# Patient Record
Sex: MALE | Race: OTHER | HISPANIC OR LATINO | URBAN - METROPOLITAN AREA
[De-identification: names, ages, dates, MRNs, and addresses within clinical notes are randomized per-mention and may not be internally consistent; named-entity substitution may affect disease eponyms.]

---

## 2023-01-15 ENCOUNTER — INPATIENT (INPATIENT)
Facility: HOSPITAL | Age: 73
LOS: 3 days | Discharge: NURSING, REHAB OR LONG-TERM CARE | DRG: 872 | End: 2023-01-19
Attending: INTERNAL MEDICINE | Admitting: STUDENT IN AN ORGANIZED HEALTH CARE EDUCATION/TRAINING PROGRAM
Payer: MEDICARE

## 2023-01-15 VITALS
RESPIRATION RATE: 16 BRPM | OXYGEN SATURATION: 98 % | SYSTOLIC BLOOD PRESSURE: 107 MMHG | HEART RATE: 94 BPM | TEMPERATURE: 98 F | WEIGHT: 184.97 LBS | DIASTOLIC BLOOD PRESSURE: 72 MMHG

## 2023-01-15 DIAGNOSIS — R53.1 WEAKNESS: ICD-10-CM

## 2023-01-15 DIAGNOSIS — A41.9 SEPSIS, UNSPECIFIED ORGANISM: ICD-10-CM

## 2023-01-15 DIAGNOSIS — H40.9 UNSPECIFIED GLAUCOMA: ICD-10-CM

## 2023-01-15 DIAGNOSIS — N40.0 BENIGN PROSTATIC HYPERPLASIA WITHOUT LOWER URINARY TRACT SYMPTOMS: ICD-10-CM

## 2023-01-15 DIAGNOSIS — K59.09 OTHER CONSTIPATION: ICD-10-CM

## 2023-01-15 DIAGNOSIS — R74.01 ELEVATION OF LEVELS OF LIVER TRANSAMINASE LEVELS: ICD-10-CM

## 2023-01-15 DIAGNOSIS — D69.6 THROMBOCYTOPENIA, UNSPECIFIED: ICD-10-CM

## 2023-01-15 DIAGNOSIS — F10.90 ALCOHOL USE, UNSPECIFIED, UNCOMPLICATED: ICD-10-CM

## 2023-01-15 DIAGNOSIS — R05.3 CHRONIC COUGH: ICD-10-CM

## 2023-01-15 DIAGNOSIS — D69.59 OTHER SECONDARY THROMBOCYTOPENIA: ICD-10-CM

## 2023-01-15 DIAGNOSIS — D50.9 IRON DEFICIENCY ANEMIA, UNSPECIFIED: ICD-10-CM

## 2023-01-15 DIAGNOSIS — M24.541 CONTRACTURE, RIGHT HAND: ICD-10-CM

## 2023-01-15 DIAGNOSIS — L03.116 CELLULITIS OF LEFT LOWER LIMB: ICD-10-CM

## 2023-01-15 DIAGNOSIS — Z87.891 PERSONAL HISTORY OF NICOTINE DEPENDENCE: ICD-10-CM

## 2023-01-15 DIAGNOSIS — M46.22 OSTEOMYELITIS OF VERTEBRA, CERVICAL REGION: ICD-10-CM

## 2023-01-15 DIAGNOSIS — M24.542 CONTRACTURE, LEFT HAND: ICD-10-CM

## 2023-01-15 LAB
ALBUMIN SERPL ELPH-MCNC: 3.1 G/DL — LOW (ref 3.4–5)
ALP SERPL-CCNC: 75 U/L — SIGNIFICANT CHANGE UP (ref 40–120)
ALT FLD-CCNC: 40 U/L — SIGNIFICANT CHANGE UP (ref 12–42)
ANION GAP SERPL CALC-SCNC: 6 MMOL/L — LOW (ref 9–16)
APPEARANCE UR: CLEAR — SIGNIFICANT CHANGE UP
AST SERPL-CCNC: 98 U/L — HIGH (ref 15–37)
BASOPHILS # BLD AUTO: 0.04 K/UL — SIGNIFICANT CHANGE UP (ref 0–0.2)
BASOPHILS NFR BLD AUTO: 0.3 % — SIGNIFICANT CHANGE UP (ref 0–2)
BILIRUB SERPL-MCNC: 0.8 MG/DL — SIGNIFICANT CHANGE UP (ref 0.2–1.2)
BILIRUB UR-MCNC: NEGATIVE — SIGNIFICANT CHANGE UP
BUN SERPL-MCNC: 17 MG/DL — SIGNIFICANT CHANGE UP (ref 7–23)
CALCIUM SERPL-MCNC: 8.8 MG/DL — SIGNIFICANT CHANGE UP (ref 8.5–10.5)
CHLORIDE SERPL-SCNC: 103 MMOL/L — SIGNIFICANT CHANGE UP (ref 96–108)
CO2 SERPL-SCNC: 27 MMOL/L — SIGNIFICANT CHANGE UP (ref 22–31)
COLOR SPEC: YELLOW — SIGNIFICANT CHANGE UP
CREAT SERPL-MCNC: 1.16 MG/DL — SIGNIFICANT CHANGE UP (ref 0.5–1.3)
DIFF PNL FLD: ABNORMAL
EGFR: 67 ML/MIN/1.73M2 — SIGNIFICANT CHANGE UP
EOSINOPHIL # BLD AUTO: 0.02 K/UL — SIGNIFICANT CHANGE UP (ref 0–0.5)
EOSINOPHIL NFR BLD AUTO: 0.1 % — SIGNIFICANT CHANGE UP (ref 0–6)
GLUCOSE SERPL-MCNC: 154 MG/DL — HIGH (ref 70–99)
GLUCOSE UR QL: NEGATIVE — SIGNIFICANT CHANGE UP
HCT VFR BLD CALC: 37.1 % — LOW (ref 39–50)
HGB BLD-MCNC: 11.5 G/DL — LOW (ref 13–17)
IMM GRANULOCYTES NFR BLD AUTO: 1 % — HIGH (ref 0–0.9)
KETONES UR-MCNC: NEGATIVE — SIGNIFICANT CHANGE UP
LEUKOCYTE ESTERASE UR-ACNC: NEGATIVE — SIGNIFICANT CHANGE UP
LIDOCAIN IGE QN: 73 U/L — SIGNIFICANT CHANGE UP (ref 73–393)
LYMPHOCYTES # BLD AUTO: 1.8 K/UL — SIGNIFICANT CHANGE UP (ref 1–3.3)
LYMPHOCYTES # BLD AUTO: 12 % — LOW (ref 13–44)
MCHC RBC-ENTMCNC: 27.8 PG — SIGNIFICANT CHANGE UP (ref 27–34)
MCHC RBC-ENTMCNC: 31 GM/DL — LOW (ref 32–36)
MCV RBC AUTO: 89.8 FL — SIGNIFICANT CHANGE UP (ref 80–100)
MONOCYTES # BLD AUTO: 1.32 K/UL — HIGH (ref 0–0.9)
MONOCYTES NFR BLD AUTO: 8.8 % — SIGNIFICANT CHANGE UP (ref 2–14)
NEUTROPHILS # BLD AUTO: 11.71 K/UL — HIGH (ref 1.8–7.4)
NEUTROPHILS NFR BLD AUTO: 77.8 % — HIGH (ref 43–77)
NITRITE UR-MCNC: NEGATIVE — SIGNIFICANT CHANGE UP
NRBC # BLD: 0 /100 WBCS — SIGNIFICANT CHANGE UP (ref 0–0)
PH UR: 6 — SIGNIFICANT CHANGE UP (ref 5–8)
PLATELET # BLD AUTO: 145 K/UL — LOW (ref 150–400)
POTASSIUM SERPL-MCNC: 4.4 MMOL/L — SIGNIFICANT CHANGE UP (ref 3.5–5.3)
POTASSIUM SERPL-SCNC: 4.4 MMOL/L — SIGNIFICANT CHANGE UP (ref 3.5–5.3)
PROT SERPL-MCNC: 7.6 G/DL — SIGNIFICANT CHANGE UP (ref 6.4–8.2)
PROT UR-MCNC: NEGATIVE MG/DL — SIGNIFICANT CHANGE UP
RBC # BLD: 4.13 M/UL — LOW (ref 4.2–5.8)
RBC # FLD: 14.5 % — SIGNIFICANT CHANGE UP (ref 10.3–14.5)
RBC CASTS # UR COMP ASSIST: < 5 /HPF — SIGNIFICANT CHANGE UP
SARS-COV-2 RNA SPEC QL NAA+PROBE: SIGNIFICANT CHANGE UP
SODIUM SERPL-SCNC: 136 MMOL/L — SIGNIFICANT CHANGE UP (ref 132–145)
SP GR SPEC: 1.02 — SIGNIFICANT CHANGE UP (ref 1–1.03)
TROPONIN I, HIGH SENSITIVITY RESULT: 55.7 NG/L — SIGNIFICANT CHANGE UP
UROBILINOGEN FLD QL: 0.2 E.U./DL — SIGNIFICANT CHANGE UP
WBC # BLD: 15.04 K/UL — HIGH (ref 3.8–10.5)
WBC # FLD AUTO: 15.04 K/UL — HIGH (ref 3.8–10.5)
WBC UR QL: < 5 /HPF — SIGNIFICANT CHANGE UP

## 2023-01-15 PROCEDURE — 71045 X-RAY EXAM CHEST 1 VIEW: CPT | Mod: 26

## 2023-01-15 PROCEDURE — 99285 EMERGENCY DEPT VISIT HI MDM: CPT

## 2023-01-15 PROCEDURE — 70450 CT HEAD/BRAIN W/O DYE: CPT | Mod: 26,MH

## 2023-01-15 PROCEDURE — 72125 CT NECK SPINE W/O DYE: CPT | Mod: 26,MH

## 2023-01-15 PROCEDURE — 93971 EXTREMITY STUDY: CPT | Mod: 26,LT

## 2023-01-15 RX ORDER — VANCOMYCIN HCL 1 G
1000 VIAL (EA) INTRAVENOUS ONCE
Refills: 0 | Status: COMPLETED | OUTPATIENT
Start: 2023-01-15 | End: 2023-01-15

## 2023-01-15 RX ORDER — CEFTRIAXONE 500 MG/1
1000 INJECTION, POWDER, FOR SOLUTION INTRAMUSCULAR; INTRAVENOUS ONCE
Refills: 0 | Status: COMPLETED | OUTPATIENT
Start: 2023-01-15 | End: 2023-01-15

## 2023-01-15 RX ADMIN — CEFTRIAXONE 100 MILLIGRAM(S): 500 INJECTION, POWDER, FOR SOLUTION INTRAMUSCULAR; INTRAVENOUS at 20:15

## 2023-01-15 RX ADMIN — Medication 250 MILLIGRAM(S): at 20:16

## 2023-01-15 NOTE — ED PROVIDER NOTE - NEUROLOGICAL, MLM
Alert and oriented, speech fluent and appropriate 4/5 motor all extremities  no motor or sensory deficits. muscle atrophy in upper extremities (old)

## 2023-01-15 NOTE — ED PROVIDER NOTE - CLINICAL SUMMARY MEDICAL DECISION MAKING FREE TEXT BOX
wife and sister present in ER at bedside.  pt is generally weak elevated wbc and cellulitis of lle    plan for admission to [facility] for iv abx wife and sister present in ER at bedside.  pt is generally weak elevated wbc and cellulitis of lle    all lab an test results dw the patient and his wife.   all questions answered   wife signed consent for transfer as patient with difficulty hold pen secondary to the chronic  atrophy of upper extremities     plan for admission to [facility] for iv abx

## 2023-01-15 NOTE — ED PROVIDER NOTE - MUSCULOSKELETAL, MLM
+ left and right leg swelling  worse on left .. well healed midline lower cervical surgical scar No cervical, thoracic ot lumbar spine tenderness to percussion or palpation. Flexion/extension of spine without pain. + left and right leg swelling.   welling is worse on left with erythema and increased warmth on left - no crepitus no ecchymoses mild tenderness to deep palpation  no .. well healed midline lower cervical surgical scar No cervical, thoracic ot lumbar spine tenderness to percussion or palpation. Flexion/extension of spine without pain.

## 2023-01-15 NOTE — ED PROVIDER NOTE - WR INTERPRETATION 1
CXR negative - No infiltrates, No consolidation, elevated left hemidiaphragm with atalectasis at base.

## 2023-01-15 NOTE — ED PROVIDER NOTE - OBJECTIVE STATEMENT
73 yo male presents with his physiatrist and wife - who all offer history - stating that patient with increased generalized weakness and possibly swelling in left lower extremities. pt has cervical No cervical, thoracic ot lumbar spine tenderness to percussion or palpation. Flexion/extension of spine without pain. surgery in 2018 s/p fall from standing height injuring spinous process    no focal weakness no sensory loss no change in speech no facial droop  no neck pain no fever 73 yo male presents with his wife - who offer history - stating that patient with increased generalized weakness with difficulty get up out of bed and ambulating over baseline with swelling in left lower extremity progressive over 4 days.  pt is typically able to walk with walker but his weakness has made him unable to do so over the last several days.     pt with fall from standing height in 2018 requiring surgery to cervical .spine - specific injury/ intervention unclear   no focal weakness no sensory loss no change in speech no facial droop  no neck pain no fever no cp no sob no cough

## 2023-01-15 NOTE — ED PROVIDER NOTE - NS_BEDUNITTYPES_ED_ALL_ED
Patient Education     Dizziness (Uncertain Cause)  Dizziness is a common symptom. It may be described as lightheadedness, spinning, or feeling like you are going to faint. Dizziness can have many causes.  Be sure to tell the healthcare provider about:  · All medicines you take, including prescription, over-the-counter, herbs, and supplements  · Any other symptoms you have  · Any health problems you are being treated for  · Any past major health problems you've had, such as a heart attack, balance issues, hearing problems, or blood pressure problems  · Anything that causes the dizziness to get worse or better  Today's exam did not show an exact cause for your dizziness. Other tests may be needed. Follow up with your healthcare provider.  Home care  · Dizziness that occurs with sudden standing may be a sign of mild dehydration. Drink extra fluids for the next few days.  · If you recently started a new medicine, stopped a medicine, or had the dose of a current medicine changed, talk with the prescribing healthcare provider. Your medicine plan may need adjustment.  · If dizziness lasts more than a few seconds, sit or lie down until it passes. This may help prevent injury in case you pass out. Get up slowly when you feel better.  · Don't drive or use power tools or dangerous equipment until you have had no dizziness for at least 48 hours.  Follow-up care  Follow up with your healthcare provider for further evaluation within the next 7 days or as advised.  When to seek medical advice  Call your healthcare provider for any of the following:  · Worsening of symptoms or new symptoms  · Passing out or seizure  · Repeated vomiting  · Headache  · Palpitations (the sense that your heart is fluttering or beating fast or hard)  · Shortness of breath  · Blood in vomit or stool (black or red color)  · Weakness of an arm or leg or 1 side of the face  · Vision or hearing changes  · Trouble walking or speaking  · Chest, arm, neck,  back, or jaw pain  Date Last Reviewed: 11/1/2017  © 2986-8043 [employer]. [address] [place] [address], [place] [address]. All rights reserved. This information is not intended as a substitute for professional medical care. Always follow your healthcare professional's instructions.              REGIONAL

## 2023-01-16 DIAGNOSIS — K59.00 CONSTIPATION, UNSPECIFIED: ICD-10-CM

## 2023-01-16 DIAGNOSIS — Z29.9 ENCOUNTER FOR PROPHYLACTIC MEASURES, UNSPECIFIED: ICD-10-CM

## 2023-01-16 DIAGNOSIS — R05.9 COUGH, UNSPECIFIED: ICD-10-CM

## 2023-01-16 DIAGNOSIS — D64.9 ANEMIA, UNSPECIFIED: ICD-10-CM

## 2023-01-16 DIAGNOSIS — R60.0 LOCALIZED EDEMA: ICD-10-CM

## 2023-01-16 DIAGNOSIS — R53.1 WEAKNESS: ICD-10-CM

## 2023-01-16 DIAGNOSIS — L03.90 CELLULITIS, UNSPECIFIED: ICD-10-CM

## 2023-01-16 DIAGNOSIS — H40.9 UNSPECIFIED GLAUCOMA: ICD-10-CM

## 2023-01-16 DIAGNOSIS — Z98.1 ARTHRODESIS STATUS: Chronic | ICD-10-CM

## 2023-01-16 DIAGNOSIS — N40.0 BENIGN PROSTATIC HYPERPLASIA WITHOUT LOWER URINARY TRACT SYMPTOMS: ICD-10-CM

## 2023-01-16 LAB
A1C WITH ESTIMATED AVERAGE GLUCOSE RESULT: 5.2 % — SIGNIFICANT CHANGE UP (ref 4–5.6)
ALBUMIN SERPL ELPH-MCNC: 3.6 G/DL — SIGNIFICANT CHANGE UP (ref 3.3–5)
ALP SERPL-CCNC: 80 U/L — SIGNIFICANT CHANGE UP (ref 40–120)
ALT FLD-CCNC: 39 U/L — SIGNIFICANT CHANGE UP (ref 10–45)
ANION GAP SERPL CALC-SCNC: 8 MMOL/L — SIGNIFICANT CHANGE UP (ref 5–17)
AST SERPL-CCNC: 114 U/L — HIGH (ref 10–40)
BASOPHILS # BLD AUTO: 0.03 K/UL — SIGNIFICANT CHANGE UP (ref 0–0.2)
BASOPHILS NFR BLD AUTO: 0.3 % — SIGNIFICANT CHANGE UP (ref 0–2)
BILIRUB SERPL-MCNC: 0.6 MG/DL — SIGNIFICANT CHANGE UP (ref 0.2–1.2)
BUN SERPL-MCNC: 12 MG/DL — SIGNIFICANT CHANGE UP (ref 7–23)
CALCIUM SERPL-MCNC: 9.1 MG/DL — SIGNIFICANT CHANGE UP (ref 8.4–10.5)
CHLORIDE SERPL-SCNC: 102 MMOL/L — SIGNIFICANT CHANGE UP (ref 96–108)
CO2 SERPL-SCNC: 26 MMOL/L — SIGNIFICANT CHANGE UP (ref 22–31)
CREAT SERPL-MCNC: 0.68 MG/DL — SIGNIFICANT CHANGE UP (ref 0.5–1.3)
EGFR: 99 ML/MIN/1.73M2 — SIGNIFICANT CHANGE UP
EOSINOPHIL # BLD AUTO: 0.15 K/UL — SIGNIFICANT CHANGE UP (ref 0–0.5)
EOSINOPHIL NFR BLD AUTO: 1.6 % — SIGNIFICANT CHANGE UP (ref 0–6)
ESTIMATED AVERAGE GLUCOSE: 103 MG/DL — SIGNIFICANT CHANGE UP (ref 68–114)
FERRITIN SERPL-MCNC: 419 NG/ML — HIGH (ref 30–400)
GLUCOSE SERPL-MCNC: 125 MG/DL — HIGH (ref 70–99)
HCT VFR BLD CALC: 39.7 % — SIGNIFICANT CHANGE UP (ref 39–50)
HGB BLD-MCNC: 12.3 G/DL — LOW (ref 13–17)
IMM GRANULOCYTES NFR BLD AUTO: 0.3 % — SIGNIFICANT CHANGE UP (ref 0–0.9)
IRON SATN MFR SERPL: 19 UG/DL — LOW (ref 45–165)
IRON SATN MFR SERPL: 8 % — LOW (ref 16–55)
LYMPHOCYTES # BLD AUTO: 1.46 K/UL — SIGNIFICANT CHANGE UP (ref 1–3.3)
LYMPHOCYTES # BLD AUTO: 15.4 % — SIGNIFICANT CHANGE UP (ref 13–44)
MAGNESIUM SERPL-MCNC: 2.1 MG/DL — SIGNIFICANT CHANGE UP (ref 1.6–2.6)
MCHC RBC-ENTMCNC: 27.8 PG — SIGNIFICANT CHANGE UP (ref 27–34)
MCHC RBC-ENTMCNC: 31 GM/DL — LOW (ref 32–36)
MCV RBC AUTO: 89.6 FL — SIGNIFICANT CHANGE UP (ref 80–100)
MONOCYTES # BLD AUTO: 1.16 K/UL — HIGH (ref 0–0.9)
MONOCYTES NFR BLD AUTO: 12.2 % — SIGNIFICANT CHANGE UP (ref 2–14)
NEUTROPHILS # BLD AUTO: 6.67 K/UL — SIGNIFICANT CHANGE UP (ref 1.8–7.4)
NEUTROPHILS NFR BLD AUTO: 70.2 % — SIGNIFICANT CHANGE UP (ref 43–77)
NRBC # BLD: 0 /100 WBCS — SIGNIFICANT CHANGE UP (ref 0–0)
NT-PROBNP SERPL-SCNC: 152 PG/ML — SIGNIFICANT CHANGE UP (ref 0–300)
PHOSPHATE SERPL-MCNC: 2.5 MG/DL — SIGNIFICANT CHANGE UP (ref 2.5–4.5)
PLATELET # BLD AUTO: 173 K/UL — SIGNIFICANT CHANGE UP (ref 150–400)
POTASSIUM SERPL-MCNC: 4.3 MMOL/L — SIGNIFICANT CHANGE UP (ref 3.5–5.3)
POTASSIUM SERPL-SCNC: 4.3 MMOL/L — SIGNIFICANT CHANGE UP (ref 3.5–5.3)
PROT SERPL-MCNC: 7.7 G/DL — SIGNIFICANT CHANGE UP (ref 6–8.3)
RAPID RVP RESULT: SIGNIFICANT CHANGE UP
RBC # BLD: 4.43 M/UL — SIGNIFICANT CHANGE UP (ref 4.2–5.8)
RBC # FLD: 14.5 % — SIGNIFICANT CHANGE UP (ref 10.3–14.5)
SARS-COV-2 RNA SPEC QL NAA+PROBE: SIGNIFICANT CHANGE UP
SODIUM SERPL-SCNC: 136 MMOL/L — SIGNIFICANT CHANGE UP (ref 135–145)
TIBC SERPL-MCNC: 239 UG/DL — SIGNIFICANT CHANGE UP (ref 220–430)
UIBC SERPL-MCNC: 220 UG/DL — SIGNIFICANT CHANGE UP (ref 110–370)
WBC # BLD: 9.5 K/UL — SIGNIFICANT CHANGE UP (ref 3.8–10.5)
WBC # FLD AUTO: 9.5 K/UL — SIGNIFICANT CHANGE UP (ref 3.8–10.5)

## 2023-01-16 PROCEDURE — 99223 1ST HOSP IP/OBS HIGH 75: CPT | Mod: GC

## 2023-01-16 RX ORDER — LATANOPROSTENE BUNOD 0.24 MG/ML
1 SOLUTION/ DROPS OPHTHALMIC
Qty: 0 | Refills: 0 | DISCHARGE

## 2023-01-16 RX ORDER — LANOLIN ALCOHOL/MO/W.PET/CERES
3 CREAM (GRAM) TOPICAL AT BEDTIME
Refills: 0 | Status: DISCONTINUED | OUTPATIENT
Start: 2023-01-16 | End: 2023-01-19

## 2023-01-16 RX ORDER — PREDNISOLONE SODIUM PHOSPHATE 1 %
1 DROPS OPHTHALMIC (EYE)
Qty: 0 | Refills: 0 | DISCHARGE

## 2023-01-16 RX ORDER — POLYETHYLENE GLYCOL 3350 17 G/17G
17 POWDER, FOR SOLUTION ORAL DAILY
Refills: 0 | Status: DISCONTINUED | OUTPATIENT
Start: 2023-01-16 | End: 2023-01-19

## 2023-01-16 RX ORDER — FINASTERIDE 5 MG/1
1 TABLET, FILM COATED ORAL
Qty: 0 | Refills: 0 | DISCHARGE

## 2023-01-16 RX ORDER — FUROSEMIDE 40 MG
1 TABLET ORAL
Qty: 0 | Refills: 0 | DISCHARGE

## 2023-01-16 RX ORDER — TIZANIDINE 4 MG/1
2 TABLET ORAL
Qty: 0 | Refills: 0 | DISCHARGE

## 2023-01-16 RX ORDER — ENOXAPARIN SODIUM 100 MG/ML
40 INJECTION SUBCUTANEOUS EVERY 24 HOURS
Refills: 0 | Status: DISCONTINUED | OUTPATIENT
Start: 2023-01-16 | End: 2023-01-19

## 2023-01-16 RX ORDER — SENNA PLUS 8.6 MG/1
2 TABLET ORAL AT BEDTIME
Refills: 0 | Status: DISCONTINUED | OUTPATIENT
Start: 2023-01-16 | End: 2023-01-19

## 2023-01-16 RX ORDER — CEFAZOLIN SODIUM 1 G
2000 VIAL (EA) INJECTION EVERY 8 HOURS
Refills: 0 | Status: DISCONTINUED | OUTPATIENT
Start: 2023-01-16 | End: 2023-01-19

## 2023-01-16 RX ORDER — TAMSULOSIN HYDROCHLORIDE 0.4 MG/1
0.8 CAPSULE ORAL AT BEDTIME
Refills: 0 | Status: DISCONTINUED | OUTPATIENT
Start: 2023-01-16 | End: 2023-01-19

## 2023-01-16 RX ORDER — TAMSULOSIN HYDROCHLORIDE 0.4 MG/1
2 CAPSULE ORAL
Qty: 0 | Refills: 0 | DISCHARGE

## 2023-01-16 RX ORDER — IPRATROPIUM/ALBUTEROL SULFATE 18-103MCG
3 AEROSOL WITH ADAPTER (GRAM) INHALATION EVERY 6 HOURS
Refills: 0 | Status: COMPLETED | OUTPATIENT
Start: 2023-01-16 | End: 2023-01-16

## 2023-01-16 RX ORDER — DORZOLAMIDE HYDROCHLORIDE TIMOLOL MALEATE 20; 5 MG/ML; MG/ML
1 SOLUTION/ DROPS OPHTHALMIC
Refills: 0 | Status: DISCONTINUED | OUTPATIENT
Start: 2023-01-16 | End: 2023-01-19

## 2023-01-16 RX ORDER — DORZOLAMIDE HYDROCHLORIDE TIMOLOL MALEATE 20; 5 MG/ML; MG/ML
1 SOLUTION/ DROPS OPHTHALMIC
Qty: 0 | Refills: 0 | DISCHARGE

## 2023-01-16 RX ORDER — ACETAMINOPHEN 500 MG
650 TABLET ORAL EVERY 6 HOURS
Refills: 0 | Status: DISCONTINUED | OUTPATIENT
Start: 2023-01-16 | End: 2023-01-19

## 2023-01-16 RX ORDER — GABAPENTIN 400 MG/1
1 CAPSULE ORAL
Qty: 0 | Refills: 0 | DISCHARGE

## 2023-01-16 RX ORDER — LANOLIN ALCOHOL/MO/W.PET/CERES
1 CREAM (GRAM) TOPICAL
Qty: 0 | Refills: 0 | DISCHARGE

## 2023-01-16 RX ORDER — FINASTERIDE 5 MG/1
5 TABLET, FILM COATED ORAL DAILY
Refills: 0 | Status: DISCONTINUED | OUTPATIENT
Start: 2023-01-16 | End: 2023-01-19

## 2023-01-16 RX ADMIN — Medication 3 MILLILITER(S): at 12:39

## 2023-01-16 RX ADMIN — Medication 100 MILLIGRAM(S): at 23:58

## 2023-01-16 RX ADMIN — Medication 1 DROP(S): at 17:40

## 2023-01-16 RX ADMIN — POLYETHYLENE GLYCOL 3350 17 GRAM(S): 17 POWDER, FOR SOLUTION ORAL at 12:39

## 2023-01-16 RX ADMIN — Medication 3 MILLILITER(S): at 23:59

## 2023-01-16 RX ADMIN — Medication 1 DROP(S): at 23:59

## 2023-01-16 RX ADMIN — Medication 100 MILLIGRAM(S): at 14:05

## 2023-01-16 RX ADMIN — Medication 3 MILLILITER(S): at 18:55

## 2023-01-16 RX ADMIN — Medication 3 MILLIGRAM(S): at 23:59

## 2023-01-16 RX ADMIN — TAMSULOSIN HYDROCHLORIDE 0.8 MILLIGRAM(S): 0.4 CAPSULE ORAL at 23:59

## 2023-01-16 RX ADMIN — DORZOLAMIDE HYDROCHLORIDE TIMOLOL MALEATE 1 DROP(S): 20; 5 SOLUTION/ DROPS OPHTHALMIC at 18:55

## 2023-01-16 RX ADMIN — ENOXAPARIN SODIUM 40 MILLIGRAM(S): 100 INJECTION SUBCUTANEOUS at 18:55

## 2023-01-16 RX ADMIN — SENNA PLUS 2 TABLET(S): 8.6 TABLET ORAL at 23:59

## 2023-01-16 NOTE — H&P ADULT - PROBLEM SELECTOR PLAN 1
Pt with chronic b/l lower extremity edema Pt with chronic b/l lower extremity edema for the past few years, but worsening on the left side for the past 4 days with associated erythema and warm to touch. No tenderness to palpation. No open areas   LE dopplers negative.    Plan:  -Cefazolin Pt with chronic b/l lower extremity edema for the past few years, but worsening on the left side for the past 4 days with associated erythema and warm to touch. No tenderness to palpation. No open areas. No MRSA risk factors.  LE dopplers negative.    Plan:  -Cefazolin 2g q8 (1/16 - ) Pt with chronic b/l lower extremity edema for the past few years, but worsening on the left side for the past 4 days with associated erythema and warm to touch. No tenderness to palpation. No open areas. No MRSA risk factors. No fever. Did have leukocytosis to 15 on admission, now resolved.  LE dopplers negative.    Plan:  -Cefazolin 2g q8 (1/16 - )  -f/u blood cultures

## 2023-01-16 NOTE — H&P ADULT - NSHPPHYSICALEXAM_GEN_ALL_CORE
LOS: 1d    VITALS:   T(C): 36.4 (01-16-23 @ 08:35), Max: 36.8 (01-16-23 @ 02:06)  HR: 97 (01-16-23 @ 08:35) (84 - 98)  BP: 149/91 (01-16-23 @ 08:35) (111/65 - 149/91)  RR: 18 (01-16-23 @ 08:35) (16 - 20)  SpO2: 96% (01-16-23 @ 08:35) (94% - 97%)    GENERAL: NAD, lying in bed comfortably  HEAD:  Atraumatic, Normocephalic  EYES: EOMI, PERRLA, conjunctiva and sclera clear  ENT: Moist mucous membranes  NECK: Supple, No JVD  CHEST/LUNG: Clear to auscultation bilaterally; No rales, rhonchi, wheezing, or rubs. Unlabored respirations. Upper airway wheeze present, but not auscultated in lower airways.   HEART: Regular rate and rhythm; No murmurs, rubs, or gallops  ABDOMEN: BSx4; Soft, nontender. Taut, distended.   EXTREMITIES:  2+ Peripheral Pulses, brisk capillary refill. No clubbing, cyanosis. +B/l +1 LE edema, left worse than right. Left lower extremity erythematous and warm to touch. Not exquisitely tender to palpation.    NERVOUS SYSTEM:  A&Ox3, no focal deficits. Generalized weakness, not asymmetry. Hands with b/l contractures.  SKIN: As above. GENERAL: NAD, lying in bed comfortably  HEAD:  Atraumatic, Normocephalic  EYES: EOMI, PERRLA, conjunctiva and sclera clear  ENT: Moist mucous membranes  NECK: Supple, No JVD  CHEST/LUNG: Clear to auscultation bilaterally; No rales, rhonchi, wheezing, or rubs. Unlabored respirations. Upper airway wheeze present, but not auscultated in lower airways.   HEART: Regular rate and rhythm; No murmurs, rubs, or gallops  ABDOMEN: BSx4; Soft, nontender. Taut, distended.   EXTREMITIES:  2+ Peripheral Pulses, brisk capillary refill. No clubbing, cyanosis. +B/l +1 LE edema, left worse than right. Left lower extremity erythematous and warm to touch. Not exquisitely tender to palpation.    NERVOUS SYSTEM:  A&Ox3, no focal deficits. Generalized weakness, not asymmetry. Hands with b/l contractures.  SKIN: As above.

## 2023-01-16 NOTE — H&P ADULT - PROBLEM SELECTOR PLAN 8
Hb 11 -12. Ferritin 419. Tsat 9.  Likely mixed AoCD and iron deficiency     -consider starting IV iron inpatient (would avoid oral iron given constipation)

## 2023-01-16 NOTE — H&P ADULT - PROBLEM SELECTOR PLAN 6
Pt with distended abdomen and chronic constipation at baseline.    -Senna and miralax Pt with distended abdomen and chronic constipation at baseline.  -Senna and miralax    #Transaminitis   AST> ALT  -f/u AM hepatitis panel and RUQ ultrasound

## 2023-01-16 NOTE — H&P ADULT - PROBLEM SELECTOR PLAN 5
Pt with h/o glaucoma and unknown eye surgery on left side.    -continue with dorzolamide+timolol eye drops BID   -Hold prednisolone and Vyzulta (pt unsure which one goes in which eye, will need more collateral)

## 2023-01-16 NOTE — PATIENT PROFILE ADULT - FUNCTIONAL ASSESSMENT - BASIC MOBILITY 6.
2-calculated by average/Not able to assess (calculate score using [facility] averaging method)

## 2023-01-16 NOTE — H&P ADULT - ATTENDING COMMENTS
72M PMH BPH, cervical spine injury in 2018 presented with progressively worsening generalized weakness and bilateral lower extremity swelling (worse on the left), found to have LLE cellulitis. 72M PMH BPH, EtOH use, cervical spine injury in 2018 presented with progressively worsening generalized weakness and bilateral lower extremity swelling (worse on the left), admitted for concern for cellulitis and acute weakness.    Labs and imaging reviewed.     ROS notable for acute onset weakness noticeable in the LLE. Reports a long standing history of weakness, most prominent after a cervical spine injury in 2018 and after COVID resulting in deformities of hands. Notes some weakness in the sole of his left foot which is new. Reports a history of chronic constipation, noting going to the bathroom several times per week. Colonoscopy within the last few years, reported to be unremarkable but poor prep at the time. Denies any changes in urinary habits or fevers, chills, weight loss.     Physical exam as follows:  Const: male, sitting up in bed; daughter at bedside, NAD  HEENT: MMM, NCAT  Pulm: CTA b/L, no w/r/r, no respiratory distress  CV: S1S2, RRR  Abd: distended, taut, non-tender to palpation; tympanic to percussion midline   Neuro: 3/5 LE b/l, unable to sustain for prolonged period above gravity; intact sensation grossly throughout; 4/5 strength with abduction of UEs, 5/5 strength of biceps/triceps, poor hand  (daughter notes this has worsened over the last couple of days), intact sensation in UEs    #Weakness - with progressive weakness s/p cervical injury in 2018 and further deconditioning post COVID however now presenting with acute weakness in LLE. MRI Cervical/thoracic/lumbar spine to assess for stenosis  #Cellulitis - Ancef 2g q8h, continue to monitor  #Sepsis - Leukocytosis, tachycardia with suspected cellulitis. Plan as noted above for cellulitis  #Chronic constipation - with previous colonoscopy evaluation within the last 4-5 yrs, reported to be normal however poor prep; may be medication related (on Gabapentin at home); bowel regimen while inpatient; will continue to monitor   #Thrombocytopenia - unclear etiology however with concurrent transaminitis (AST>ALT) in the setting of history of heavy EtOH use, abdominal distention; no previous abdominal imaging; will obtain complete abdominal US with duplex to assess liver morphology, spleen and assess for presence of ascites. Obtain AM coags and Hepatitis panel  #Transaminitis - as noted above for thrombocytopenia; Hepatitis panel  #Abdominal distention - as noted above for thrombocytopenia   #EtOH use - plan as noted above for thrombocytopenia     Above noted plan discussed with resident physician and patient.

## 2023-01-16 NOTE — H&P ADULT - ASSESSMENT
72 year old male with PMH of BPH, cervical spine injury in 2018 presented with progressively worsening generalized weakness and bilateral lower extremity swelling (worse on the left), found to have cellulitis.  72 year old male with PMH of BPH, cervical spine injury in 2018 presented with progressively worsening generalized weakness and bilateral lower extremity swelling (worse on the left), found to have LLE cellulitis.

## 2023-01-16 NOTE — H&P ADULT - NSHPREVIEWOFSYSTEMS_GEN_ALL_CORE
REVIEW OF SYSTEMS:  CONSTITUTIONAL: No fevers or chills. +Generalized weakness, especially in upper extremities.   EYES/ENT: No visual changes;  No vertigo or throat pain   NECK: No pain or stiffness  RESPIRATORY: No wheezing, hemoptysis; No shortness of breath. +JUAREZ. +Chronic cough  CARDIOVASCULAR: No chest pain or palpitations  GASTROINTESTINAL: No abdominal or epigastric pain. No nausea, vomiting, or hematemesis; No diarrhea. No melena or hematochezia. +Chronic constipation and abd distention   GENITOURINARY: No dysuria, frequency or hematuria  NEUROLOGICAL: No numbness. +Weakness.   SKIN: LLE painful and swollen

## 2023-01-16 NOTE — H&P ADULT - PROBLEM SELECTOR PLAN 4
Pt with chronic cough and dyspnea on exertion for past 1 year. Mildly productive of sputum. Former smoker. CXR with possible LLL atelectasis. He reports he was never told her had asthma or COPD. Upper airway noise on exam but no wheeze in lower airway. Low concern for infection. Suspecting COPD.    Plan:  -Trial of duonebs  -Guaifenesin PRN  -PFTs as outpatient   -f/u RVP Pt with chronic cough and dyspnea on exertion for past 1 year. Mildly productive of sputum. Former smoker. CXR with possible LLL atelectasis. He reports he was never told her had asthma or COPD. Upper airway noise on exam but no wheeze in lower airway. Low concern for infection. RVP negative.  Suspecting  undiagnosed COPD vs atelectasis from immobility.    Plan:  -Trial of duonebs  -Guaifenesin PRN  -PFTs as outpatient   -Incentive spirometer

## 2023-01-16 NOTE — H&P ADULT - HISTORY OF PRESENT ILLNESS
72 year old male with PMH of cervical spine injury in 2018 presented to [facility] with his wife on 1/15 with complaints of progressively worsening generalized weakness and bilateral lower extremity swelling (worse on the left). He had a fall in 2018 and required cervical spine surgery. After the surgery, he reports he had worsening weakness, especially in his upper extremities. He had difficulty getting physical therapy after the pandemic and his hands became contracted during that time. His overall mobility and functional capacity has progressively declined since the surgery. He reports some dyspnea with exertion and a productive cough which has been going on for months. He is a former smoker, but never diagnosed with COPD. He has had worsening bilateral lower extremity edema in recent months, but his left lower extremity below the knee has become more swollen, warm, and pink in the past four days. It's not severely painful and he hasn't taken anything for it. No recent trauma to the area.     He denies nausea/vomiting/diarrhea, fever, dysuria.     Note patient does not know all of his medical history - he described what sounded like PVD and a visit to a vascular surgeon for an angioplasty in the past but is unclear on details. He reports he has no other medical problems, but takes a few medications and he doesn't know their names. His pharmacy is closed today, but his wife is bringing in his med list.     ED COURSE:   Initial vitals: Temp 97.8F, HR 94, /72, RR 16, O2 98% RA   Labs significant for WBC 15.04, hgb 11.5, hct 37.1, plt 145, %neuts 77.8%, glucose 154, alb 3.1, AST 98, lipase negative, trops negative   UA negative   CT head negative for acute pathology   CT cervical spine negative   LLE dopplers negative for DVT   CXR no infiltrates, No consolidation, elevated left hemidiaphragm with atalectasis at base.  EKG NSR, rate 88, no ST-T changes, QTc 416  Medications: ceftriaxone 1g IV x1, vancomycin 1g IV x1  Consults: none    72 year old male with PMH of cervical spine injury in 2018 presented to [facility] with his wife on 1/15 with complaints of progressively worsening generalized weakness and bilateral lower extremity swelling (worse on the left). He had a fall in 2018 and required cervical spine surgery. After the surgery, he reports he had worsening weakness, especially in his upper extremities. He had difficulty getting physical therapy after the pandemic and his hands became contracted during that time. His overall mobility and functional capacity has progressively declined since the surgery. He reports some dyspnea with exertion and a productive cough which has been going on for months. He is a former smoker, but never diagnosed with COPD. He has had worsening bilateral lower extremity edema in recent months, but his left lower extremity below the knee has become more swollen, warm, and pink in the past four days. It's not severely painful and he hasn't taken anything for it. No recent trauma to the area.     He denies nausea/vomiting/diarrhea, fever, dysuria.     Note patient does not know all of his medical history - he described what sounded like PVD and a visit to a vascular surgeon for an angioplasty in the past but is unclear on details. His medication list is updated as below. He is not prescribed any statin, anti-platelet, or anticoagulant.    ED COURSE:   Initial vitals: Temp 97.8F, HR 94, /72, RR 16, O2 98% RA   Labs significant for WBC 15.04, hgb 11.5, hct 37.1, plt 145, %neuts 77.8%, glucose 154, alb 3.1, AST 98, lipase negative, trops negative   UA negative   CT head negative for acute pathology   CT cervical spine negative   LLE dopplers negative for DVT   CXR no infiltrates, No consolidation, elevated left hemidiaphragm with atalectasis at base.  EKG NSR, rate 88, no ST-T changes, QTc 416  Medications: ceftriaxone 1g IV x1, vancomycin 1g IV x1  Consults: none    72 year old male with PMH of BPH, cervical spine injury in 2018 presented to [facility] with his wife on 1/15 with complaints of progressively worsening generalized weakness and bilateral lower extremity swelling (worse on the left). He had a fall in 2018 and required cervical spine surgery. After the surgery, he reports he had worsening weakness, especially in his upper extremities. He had difficulty getting physical therapy after the pandemic and his hands became contracted during that time. His overall mobility and functional capacity has progressively declined since the surgery. He reports some dyspnea with exertion and a productive cough which has been going on for months. He is a former smoker, but never diagnosed with COPD. He has had worsening bilateral lower extremity edema in recent months, but his left lower extremity below the knee has become more swollen, warm, and pink in the past four days. It's not severely painful and he hasn't taken anything for it. No recent trauma to the area.     He denies nausea/vomiting/diarrhea, fever, dysuria.     Note patient does not know all of his medical history - he described what sounded like PVD and a visit to a vascular surgeon for an angioplasty in the past but is unclear on details. His medication list is updated as below. He is not prescribed any statin, anti-platelet, or anticoagulant.    ED COURSE:   Initial vitals: Temp 97.8F, HR 94, /72, RR 16, O2 98% RA   Labs significant for WBC 15.04, hgb 11.5, hct 37.1, plt 145, %neuts 77.8%, glucose 154, alb 3.1, AST 98, lipase negative, trops negative   UA negative   CT head negative for acute pathology   CT cervical spine negative   LLE dopplers negative for DVT   CXR no infiltrates, No consolidation, elevated left hemidiaphragm with atalectasis at base.  EKG NSR, rate 88, no ST-T changes, QTc 416  Medications: ceftriaxone 1g IV x1, vancomycin 1g IV x1  Consults: none

## 2023-01-16 NOTE — PATIENT PROFILE ADULT - FALL HARM RISK - HARM RISK INTERVENTIONS

## 2023-01-16 NOTE — H&P ADULT - PROBLEM SELECTOR PLAN 3
Pt with chronic b/l lower extremity edema for the past few years. He reports he had a cardiac workup and he was told he doesn't have heart failure. Pt with chronic b/l lower extremity edema for the past few years. He reports he had a cardiac workup and he was told he doesn't have heart failure. . Reports he saw a vascular surgeon at one point and a procedure was done but he doesn't know what and wasn't given any diagnosis or medications.  Unknown etiology of LE edema. Possibly PVD or lymphedema.   Takes furosemide 40mg daily at home.    Plan:  -Hold home furosemide and consider restarting if needed

## 2023-01-16 NOTE — H&P ADULT - PROBLEM SELECTOR PLAN 2
Pt with worsening generalized weakness since cervical surgery in 2018 (patient does not know details of surgery). Worse in upper extremities. Hands with b/l contractures. His wife and HHA assist with ADLs.     Plan:  -PT consult  -Hold home tizanidine 2mg in AM and 4mg in PM and gabapentin 100mg TID since pt reports they don't help. Can restart as needed. Pt with worsening generalized weakness since cervical surgery in 2018 (patient does not know details of surgery). Worse in upper extremities. Hands with b/l contractures. His wife and HHA assist with ADLs.     Plan:  -PT consult  -Hold home tizanidine 2mg in AM and 4mg in PM and gabapentin 100mg TID since pt reports they don't help. Can restart as needed.  -Need collateral on surgery   -No need for imaging or neuro consult at this time given no acute, new changes

## 2023-01-16 NOTE — PATIENT PROFILE ADULT - PACKS PER DAY
"Requested Prescriptions   Pending Prescriptions Disp Refills     triamterene-hydrochlorothiazide (DYAZIDE) 37.5-25 MG per capsule [Pharmacy Med Name: TRIAMTERENE-HCTZ 37.5-25MG CAPS] 90 capsule 1    Last Written Prescription Date:  2/7/18  Last Fill Quantity: 90,  # refills: 0   Last Office Visit with [facility] provider:  11/3/17   Future Office Visit:    Next 5 appointments (look out 90 days)     Mar 29, 2018  3:15 PM CDT   Return Visit with Mary Weir MD   [facility])    [facility]  [address]  [facility] [id number] [phone]                  Sig: TAKE ONE CAPSULE BY MOUTH ONCE DAILY    Diuretics (Including Combos) Protocol Passed    2/7/2018  3:26 PM       Passed - Blood pressure under 140/90    BP Readings from Last 3 Encounters:   12/14/17 137/80   11/22/17 128/82   11/16/17 129/82                Passed - Recent or future visit with authorizing provider's specialty    Patient had office visit in the last year or has a visit in the next 30 days with authorizing provider.  See \"Patient Info\" tab in inbasket, or \"Choose Columns\" in Meds & Orders section of the refill encounter.            Passed - Patient is age 18 or older       Passed - No active pregancy on record       Passed - Normal serum creatinine on file in past 12 months    Recent Labs   Lab Test  12/08/17   0835   CR  0.73             Passed - Normal serum potassium on file in past 12 months    Recent Labs   Lab Test  12/08/17   0835   POTASSIUM  3.6                   Passed - Normal serum sodium on file in past 12 months    Recent Labs   Lab Test  12/08/17   0835   NA  135             Passed - No positive pregnancy test in past 12 months          " 0.5

## 2023-01-17 LAB
ALBUMIN SERPL ELPH-MCNC: 3.3 G/DL — SIGNIFICANT CHANGE UP (ref 3.3–5)
ALP SERPL-CCNC: 83 U/L — SIGNIFICANT CHANGE UP (ref 40–120)
ALT FLD-CCNC: 34 U/L — SIGNIFICANT CHANGE UP (ref 10–45)
ANION GAP SERPL CALC-SCNC: 9 MMOL/L — SIGNIFICANT CHANGE UP (ref 5–17)
APTT BLD: 29.8 SEC — SIGNIFICANT CHANGE UP (ref 27.5–35.5)
AST SERPL-CCNC: 85 U/L — HIGH (ref 10–40)
BASOPHILS # BLD AUTO: 0.02 K/UL — SIGNIFICANT CHANGE UP (ref 0–0.2)
BASOPHILS NFR BLD AUTO: 0.2 % — SIGNIFICANT CHANGE UP (ref 0–2)
BILIRUB SERPL-MCNC: 0.6 MG/DL — SIGNIFICANT CHANGE UP (ref 0.2–1.2)
BUN SERPL-MCNC: 9 MG/DL — SIGNIFICANT CHANGE UP (ref 7–23)
CALCIUM SERPL-MCNC: 8.7 MG/DL — SIGNIFICANT CHANGE UP (ref 8.4–10.5)
CHLORIDE SERPL-SCNC: 103 MMOL/L — SIGNIFICANT CHANGE UP (ref 96–108)
CO2 SERPL-SCNC: 24 MMOL/L — SIGNIFICANT CHANGE UP (ref 22–31)
CREAT SERPL-MCNC: 0.72 MG/DL — SIGNIFICANT CHANGE UP (ref 0.5–1.3)
EGFR: 97 ML/MIN/1.73M2 — SIGNIFICANT CHANGE UP
EOSINOPHIL # BLD AUTO: 0.24 K/UL — SIGNIFICANT CHANGE UP (ref 0–0.5)
EOSINOPHIL NFR BLD AUTO: 2.9 % — SIGNIFICANT CHANGE UP (ref 0–6)
GLUCOSE SERPL-MCNC: 119 MG/DL — HIGH (ref 70–99)
HAV IGM SER-ACNC: SIGNIFICANT CHANGE UP
HBV CORE AB SER-ACNC: SIGNIFICANT CHANGE UP
HBV CORE IGM SER-ACNC: SIGNIFICANT CHANGE UP
HBV CORE IGM SER-ACNC: SIGNIFICANT CHANGE UP
HBV SURFACE AB SER-ACNC: REACTIVE
HBV SURFACE AG SER-ACNC: SIGNIFICANT CHANGE UP
HBV SURFACE AG SER-ACNC: SIGNIFICANT CHANGE UP
HCT VFR BLD CALC: 35.1 % — LOW (ref 39–50)
HCV AB S/CO SERPL IA: 0.04 S/CO — SIGNIFICANT CHANGE UP
HCV AB SERPL-IMP: SIGNIFICANT CHANGE UP
HGB BLD-MCNC: 11 G/DL — LOW (ref 13–17)
IMM GRANULOCYTES NFR BLD AUTO: 0.7 % — SIGNIFICANT CHANGE UP (ref 0–0.9)
INR BLD: 1.11 — SIGNIFICANT CHANGE UP (ref 0.88–1.16)
LYMPHOCYTES # BLD AUTO: 1.74 K/UL — SIGNIFICANT CHANGE UP (ref 1–3.3)
LYMPHOCYTES # BLD AUTO: 21.3 % — SIGNIFICANT CHANGE UP (ref 13–44)
MAGNESIUM SERPL-MCNC: 1.8 MG/DL — SIGNIFICANT CHANGE UP (ref 1.6–2.6)
MCHC RBC-ENTMCNC: 27.4 PG — SIGNIFICANT CHANGE UP (ref 27–34)
MCHC RBC-ENTMCNC: 31.3 GM/DL — LOW (ref 32–36)
MCV RBC AUTO: 87.5 FL — SIGNIFICANT CHANGE UP (ref 80–100)
MONOCYTES # BLD AUTO: 0.99 K/UL — HIGH (ref 0–0.9)
MONOCYTES NFR BLD AUTO: 12.1 % — SIGNIFICANT CHANGE UP (ref 2–14)
NEUTROPHILS # BLD AUTO: 5.1 K/UL — SIGNIFICANT CHANGE UP (ref 1.8–7.4)
NEUTROPHILS NFR BLD AUTO: 62.8 % — SIGNIFICANT CHANGE UP (ref 43–77)
NRBC # BLD: 0 /100 WBCS — SIGNIFICANT CHANGE UP (ref 0–0)
PHOSPHATE SERPL-MCNC: 3.1 MG/DL — SIGNIFICANT CHANGE UP (ref 2.5–4.5)
PLATELET # BLD AUTO: 176 K/UL — SIGNIFICANT CHANGE UP (ref 150–400)
POTASSIUM SERPL-MCNC: 4 MMOL/L — SIGNIFICANT CHANGE UP (ref 3.5–5.3)
POTASSIUM SERPL-SCNC: 4 MMOL/L — SIGNIFICANT CHANGE UP (ref 3.5–5.3)
PROT SERPL-MCNC: 7 G/DL — SIGNIFICANT CHANGE UP (ref 6–8.3)
PROTHROM AB SERPL-ACNC: 13.2 SEC — SIGNIFICANT CHANGE UP (ref 10.5–13.4)
RBC # BLD: 4.01 M/UL — LOW (ref 4.2–5.8)
RBC # FLD: 14.6 % — HIGH (ref 10.3–14.5)
SODIUM SERPL-SCNC: 136 MMOL/L — SIGNIFICANT CHANGE UP (ref 135–145)
WBC # BLD: 8.15 K/UL — SIGNIFICANT CHANGE UP (ref 3.8–10.5)
WBC # FLD AUTO: 8.15 K/UL — SIGNIFICANT CHANGE UP (ref 3.8–10.5)

## 2023-01-17 PROCEDURE — 76700 US EXAM ABDOM COMPLETE: CPT | Mod: 26,59

## 2023-01-17 PROCEDURE — 72146 MRI CHEST SPINE W/O DYE: CPT | Mod: 26

## 2023-01-17 PROCEDURE — 93976 VASCULAR STUDY: CPT | Mod: 26

## 2023-01-17 PROCEDURE — 99233 SBSQ HOSP IP/OBS HIGH 50: CPT

## 2023-01-17 PROCEDURE — 72148 MRI LUMBAR SPINE W/O DYE: CPT | Mod: 26

## 2023-01-17 PROCEDURE — 72141 MRI NECK SPINE W/O DYE: CPT | Mod: 26

## 2023-01-17 RX ORDER — MAGNESIUM SULFATE 500 MG/ML
1 VIAL (ML) INJECTION ONCE
Refills: 0 | Status: COMPLETED | OUTPATIENT
Start: 2023-01-17 | End: 2023-01-17

## 2023-01-17 RX ADMIN — FINASTERIDE 5 MILLIGRAM(S): 5 TABLET, FILM COATED ORAL at 11:34

## 2023-01-17 RX ADMIN — Medication 1 DROP(S): at 07:25

## 2023-01-17 RX ADMIN — Medication 100 MILLIGRAM(S): at 07:25

## 2023-01-17 RX ADMIN — DORZOLAMIDE HYDROCHLORIDE TIMOLOL MALEATE 1 DROP(S): 20; 5 SOLUTION/ DROPS OPHTHALMIC at 18:48

## 2023-01-17 RX ADMIN — Medication 3 MILLIGRAM(S): at 23:36

## 2023-01-17 RX ADMIN — Medication 1 DROP(S): at 14:08

## 2023-01-17 RX ADMIN — Medication 100 GRAM(S): at 11:35

## 2023-01-17 RX ADMIN — TAMSULOSIN HYDROCHLORIDE 0.8 MILLIGRAM(S): 0.4 CAPSULE ORAL at 23:35

## 2023-01-17 RX ADMIN — Medication 5 MILLIGRAM(S): at 11:34

## 2023-01-17 RX ADMIN — SENNA PLUS 2 TABLET(S): 8.6 TABLET ORAL at 23:35

## 2023-01-17 RX ADMIN — DORZOLAMIDE HYDROCHLORIDE TIMOLOL MALEATE 1 DROP(S): 20; 5 SOLUTION/ DROPS OPHTHALMIC at 07:25

## 2023-01-17 RX ADMIN — POLYETHYLENE GLYCOL 3350 17 GRAM(S): 17 POWDER, FOR SOLUTION ORAL at 11:35

## 2023-01-17 RX ADMIN — Medication 100 MILLIGRAM(S): at 14:08

## 2023-01-17 RX ADMIN — Medication 100 MILLIGRAM(S): at 23:34

## 2023-01-17 RX ADMIN — ENOXAPARIN SODIUM 40 MILLIGRAM(S): 100 INJECTION SUBCUTANEOUS at 18:47

## 2023-01-17 NOTE — OCCUPATIONAL THERAPY INITIAL EVALUATION ADULT - RANGE OF MOTION EXAMINATION, LOWER EXTREMITY
B hip flexion limited 2/2 swelling and decrease strength/bilateral LE Active ROM was WFL  (within functional limits)

## 2023-01-17 NOTE — PROGRESS NOTE ADULT - PROBLEM SELECTOR PLAN 5
Pt with h/o glaucoma and unknown eye surgery on left side.    -continue with dorzolamide+timolol eye drops BID   -Hold prednisolone and Vyzulta (pt unsure which one goes in which eye, will need more collateral) Pt with h/o glaucoma and unknown eye surgery on left side.    -continue with dorzolamide+timolol eye drops BID   -Hold prednisolone and Vyzulta (pt unsure which one goes in which eye, will need more collateral)  - collateral from Dr. Laird, ophthalmologist

## 2023-01-17 NOTE — PROGRESS NOTE ADULT - ASSESSMENT
72 year old male with PMH of BPH, cervical spine injury in 2018 presented with progressively worsening generalized weakness and bilateral lower extremity swelling (worse on the left), found to have LLE cellulitis.  72 year old male with PMH of BPH, cervical spine injury in 2018 presented with progressively worsening generalized weakness and b/l lower extremity swelling (L>R), found to have LLE cellulitis.

## 2023-01-17 NOTE — CONSULT NOTE ADULT - SUBJECTIVE AND OBJECTIVE BOX
Patient is a 72y old  Male who presents with a chief complaint of       HPI:  72 year old male with PMH of BPH, cervical spine injury in 2018 presented to [facility] with his wife on 1/15 with complaints of progressively worsening generalized weakness and bilateral lower extremity swelling (worse on the left). He had a fall in 2018 and required cervical spine surgery. After the surgery, he reports he had worsening weakness, especially in his upper extremities. He had difficulty getting physical therapy after the pandemic and his hands became contracted during that time. His overall mobility and functional capacity has progressively declined since the surgery. He reports some dyspnea with exertion and a productive cough which has been going on for months. He is a former smoker, but never diagnosed with COPD. He has had worsening bilateral lower extremity edema in recent months, but his left lower extremity below the knee has become more swollen, warm, and pink in the past four days. It's not severely painful and he hasn't taken anything for it. No recent trauma to the area.     He denies nausea/vomiting/diarrhea, fever, dysuria.     Note patient does not know all of his medical history - he described what sounded like PVD and a visit to a vascular surgeon for an angioplasty in the past but is unclear on details. His medication list is updated as below. He is not prescribed any statin, anti-platelet, or anticoagulant.    ED COURSE:   Initial vitals: Temp 97.8F, HR 94, /72, RR 16, O2 98% RA   Labs significant for WBC 15.04, hgb 11.5, hct 37.1, plt 145, %neuts 77.8%, glucose 154, alb 3.1, AST 98, lipase negative, trops negative   UA negative   CT head negative for acute pathology   CT cervical spine negative   LLE dopplers negative for DVT   CXR no infiltrates, No consolidation, elevated left hemidiaphragm with atalectasis at base.  EKG NSR, rate 88, no ST-T changes, QTc 416  Medications: ceftriaxone 1g IV x1, vancomycin 1g IV x1  Consults: none    (2023 10:25)      PAST MEDICAL & SURGICAL HISTORY:  BPH without urinary obstruction      Glaucoma          MEDICATIONS  (STANDING):  artificial  tears Solution 1 Drop(s) Both EYES three times a day  bisacodyl 5 milliGRAM(s) Oral once  ceFAZolin   IVPB 2000 milliGRAM(s) IV Intermittent every 8 hours  dorzolamide 2%/timolol 0.5% Ophthalmic Solution 1 Drop(s) Both EYES two times a day  enoxaparin Injectable 40 milliGRAM(s) SubCutaneous every 24 hours  finasteride 5 milliGRAM(s) Oral daily  melatonin 3 milliGRAM(s) Oral at bedtime  polyethylene glycol 3350 17 Gram(s) Oral daily  senna 2 Tablet(s) Oral at bedtime  tamsulosin 0.8 milliGRAM(s) Oral at bedtime    MEDICATIONS  (PRN):  acetaminophen     Tablet .. 650 milliGRAM(s) Oral every 6 hours PRN Temp greater or equal to 38C (100.4F), Mild Pain (1 - 3)  guaiFENesin Oral Liquid (Sugar-Free) 100 milliGRAM(s) Oral every 6 hours PRN Cough      FAMILY HISTORY:    CBC Full  -  ( 2023 05:30 )  WBC Count : 8.15 K/uL  RBC Count : 4.01 M/uL  Hemoglobin : 11.0 g/dL  Hematocrit : 35.1 %  Platelet Count - Automated : 176 K/uL  Mean Cell Volume : 87.5 fl  Mean Cell Hemoglobin : 27.4 pg  Mean Cell Hemoglobin Concentration : 31.3 gm/dL  Auto Neutrophil # : 5.10 K/uL  Auto Lymphocyte # : 1.74 K/uL  Auto Monocyte # : 0.99 K/uL  Auto Eosinophil # : 0.24 K/uL  Auto Basophil # : 0.02 K/uL  Auto Neutrophil % : 62.8 %  Auto Lymphocyte % : 21.3 %  Auto Monocyte % : 12.1 %  Auto Eosinophil % : 2.9 %  Auto Basophil % : 0.2 %          136  |  103  |  9   ----------------------------<  119<H>  4.0   |  24  |  0.72    Ca    8.7      2023 05:30  Phos  3.1       Mg     1.8         TPro  7.0  /  Alb  3.3  /  TBili  0.6  /  DBili  x   /  AST  85<H>  /  ALT  34  /  AlkPhos  83        Urinalysis Basic - ( [address] [place] 2023 15:09 )    Color: Yellow / Appearance: Clear / S.020 / pH: x  Gluc: x / Ketone: NEGATIVE  / Bili: NEGATIVE / Urobili: 0.2 E.U./dL   Blood: x / Protein: NEGATIVE mg/dL / Nitrite: NEGATIVE   Leuk Esterase: NEGATIVE / RBC: < 5 /HPF / WBC < 5 /HPF   Sq Epi: x / Non Sq Epi: x / Bacteria: x          Radiology :     < from: Xray Chest 1 View AP/PA (01.15.23 @ 17:35) >  ACC: [id number] EXAM:  XR CHEST AP OR PA 1V                          PROCEDURE DATE:  01/15/2023          INTERPRETATION:  X-RAY CHEST    HISTORY: Weakness. Back pain    COMPARISON: None.    TECHNIQUE: Portable AP view of the chest.    FINDINGS:    Lines/devices: None.    Lungs/pleura: Elevated left hemidiaphragm with adjacent small volume left   lower lobe atelectasis. No pneumothorax. No pleural effusion.    Cardiomediastinal silhouette: Within normal limits.    Other: Degenerative changes of the spine.    IMPRESSION:  Elevated left hemidiaphragm with adjacent small volume left lower lobe   atelectasis.        < from: CT Head No Cont (01.15.23 @ 19:04) >  ACC: [id number] EXAM:  CT BRAIN                          PROCEDURE DATE:  01/15/2023          INTERPRETATION:  CT HEAD WITHOUT CONTRAST    INDICATIONS: Weakness. History of C-spine surgery.    TECHNIQUE: Serial axial images were obtained from the skull base to the   vertex without the use of intravenous contrast. Sagittal and coronal   reformatted images were created from the axial data set. Images were   reviewed in the bone, brain and subdural windows.    COMPARISON: None.    FINDINGS:  INTRA-AXIAL: No intracranial mass effect, acute hemorrhage, midline shift   or acute transcortical infarct is seen. There are patchy periventricular   white matter hypodensities which are nonspecific and may represent the   sequela of mild long-standing small vessel ischemic disease.  EXTRA-AXIAL: No extra-axial fluid collection is present.  VENTRICLES AND SULCI: Parenchymal volume is commensurate with patient   age. No hydrocephalus.  VISUALIZED SINUSES: No air-fluid levels are identified.  VISUALIZED MASTOIDS: Clear.  CALVARIUM: Normal.  MISCELLANEOUS: Left ocular device. Absent left native lens.    IMPRESSION: No acute intracranial abnormality.      < from: CT Cervical Spine No Cont (01.15.23 @ 19:04) >  ACC: [id number] EXAM:  CT CERVICAL SPINE                          PROCEDURE DATE:  01/15/2023          INTERPRETATION:  CLINICAL INDICATION: Weakness. History of C-spine   surgery.    TECHNIQUE: CT of the cervical spine was performed without the   administration of intravenous contrast, according to standard protocol.    COMPARISON: None.    FINDINGS:    ALIGNMENT: Straightening of the cervical spine. Chronic trace   retrolisthesis of C3 on C4.    VERTEBRAE: No acute fracture. Status post posterior fusion and   laminectomy of C3-C6. No evidence of hardware loosening or fracture.   Partial to complete fusion of the C3-C7 vertebral bodies. Multilevel   bulky anterior osteophytes.    DISCS: Severe disc height loss at C7-T1 and T1-2.    PARAVERTEBRAL SOFT TISSUES: No significant abnormality.    EVALUATION OF INDIVIDUAL LEVELS DEMONSTRATES: Limited evaluation of the   spinal canal due to streak artifact from posterior fusion hardware. No   significant canal stenosis is visualized. Multilevel bony neural   foraminal narrowing.    IMPRESSION:    No acute fracture or traumatic malalignment.      < from: US Duplex Venous Lower Ext Ltd, Left (01.15.23 @ 16:50) >  ACC: [id number] EXAM:  US DPLX LWR EXT VEINS LTD LT                          PROCEDURE DATE:  01/15/2023          INTERPRETATION:  CLINICAL INFORMATION: LEFT lower extremity swelling    COMPARISON: None available.    TECHNIQUE: Duplex sonography of the LEFT LOWER extremity veins with color   and spectral Doppler, with and without compression.    FINDINGS:    There is normal compressibility of the left common femoral, femoral and   popliteal veins.  The contralateral common femoral vein is patent.  Doppler examination shows normal spontaneous and phasic flow.    No calf vein thrombosis is detected.    IMPRESSION:  No evidence of left lower extremity deep venous thrombosis.          Vital Signs Last 24 Hrs  T(C): 36.7 (2023 06:00), Max: 37.2 (2023 22:27)  T(F): 98.1 (2023 06:00), Max: 98.9 (2023 22:27)  HR: 96 (2023 06:00) (89 - 96)  BP: 138/84 (2023 06:00) (108/68 - 138/84)  BP(mean): --  RR: 19 (2023 06:00) (18 - 19)  SpO2: 98% (2023 06:00) (96% - 98%)    Parameters below as of 2023 06:00  Patient On (Oxygen Delivery Method): room air            REVIEW OF SYSTEMS:  per hpi        Physical Exam:  72 y o man lying comfortably in semi Nunn's position , awake , alert , no new complaints     Head : normocephalic , atraumatic    Eyes : PERRLA , EOMI , no nystagmus , sclera anicteric    ENT : nasal discharge , uvula midline , no oropharyngeal erythema / exudate    Neck : supple , negative JVD , negative carotid bruits , no thyromegaly    Chest : CTA bilaterally , neg wheeze / rhonchi / rales / crackles / egophany    Cardiovascular: regular rate and rhythm , neg murmurs / rubs / gallops    Abdomen : soft , non distended , non tender to palpation in all 4 quadrants , negative rebound / guarding , normal bowel sounds    Extremities :  1-2 +  L>R LE edema , feroz flexor hand contractures        Neurologic Exam:    Alert and oriented  x 3     Motor Exam:          Right UE:               no focal weakness ,  > 3+/5 throughout except for hand 3/5                                Left UE:                 no focal weakness ,  > 3+/5 throughout except for hand 3/5    Right LE:                no focal weakness,  > 3+/5 throughout      Left LE:                  no focal weakness,  > 3+/5 throughout           Sensation:         intact to light touch x 4 extremities                        DTR :                     biceps/brachioradialis : equal                                              patella/ankle : equal      Gait :  not tested        PM&R Impression :     1)  admitted for LLE cellulitis        Recommendations / Plan :     1) Physical / Occupational therapy focusing on therapeutic exercises , equipment evaluation , bed mobility/transfer out of bed evaluation , progressive ambulation with assistive devices prn .    2) Current disposition plan recommendation  :  pending functional progress

## 2023-01-17 NOTE — OCCUPATIONAL THERAPY INITIAL EVALUATION ADULT - RANGE OF MOTION EXAMINATION, UPPER EXTREMITY
Pt presents with chronic and residual B UE decrease ROM and digit extension, reporting deficits persist since spinal sx/bilateral UE Active Assistive ROM was WFL  (within functional limits)

## 2023-01-17 NOTE — PROGRESS NOTE ADULT - SUBJECTIVE AND OBJECTIVE BOX
OVERNIGHT EVENTS: no acute events    SUBJECTIVE / INTERVAL HPI: Patient seen and examined at bedside.     VITAL SIGNS:  Vital Signs Last 24 Hrs  T(C): 36.7 (2023 06:00), Max: 37.2 (2023 22:27)  T(F): 98.1 (2023 06:00), Max: 98.9 (2023 22:27)  HR: 96 (2023 06:00) (89 - 96)  BP: 138/84 (2023 06:00) (108/68 - 138/84)  BP(mean): --  RR: 19 (2023 06:00) (18 - 19)  SpO2: 98% (2023 06:00) (96% - 98%)    Parameters below as of 2023 06:00  Patient On (Oxygen Delivery Method): room air        PHYSICAL EXAM:    General:   HEENT:  Neck:   Cardiovascular:   Respiratory:  Gastrointestinal:   Extremities:   Vascular:   Neurological:     MEDICATIONS:  MEDICATIONS  (STANDING):  artificial  tears Solution 1 Drop(s) Both EYES three times a day  bisacodyl 5 milliGRAM(s) Oral once  ceFAZolin   IVPB 2000 milliGRAM(s) IV Intermittent every 8 hours  dorzolamide 2%/timolol 0.5% Ophthalmic Solution 1 Drop(s) Both EYES two times a day  enoxaparin Injectable 40 milliGRAM(s) SubCutaneous every 24 hours  finasteride 5 milliGRAM(s) Oral daily  magnesium sulfate  IVPB 1 Gram(s) IV Intermittent once  melatonin 3 milliGRAM(s) Oral at bedtime  polyethylene glycol 3350 17 Gram(s) Oral daily  senna 2 Tablet(s) Oral at bedtime  tamsulosin 0.8 milliGRAM(s) Oral at bedtime    MEDICATIONS  (PRN):  acetaminophen     Tablet .. 650 milliGRAM(s) Oral every 6 hours PRN Temp greater or equal to 38C (100.4F), Mild Pain (1 - 3)  guaiFENesin Oral Liquid (Sugar-Free) 100 milliGRAM(s) Oral every 6 hours PRN Cough      ALLERGIES:  Allergies    No Known Allergies    Intolerances        LABS:                        11.0   8.15  )-----------( 176      ( 2023 05:30 )             35.1     -    136  |  103  |  9   ----------------------------<  119<H>  4.0   |  24  |  0.72    Ca    8.7      2023 05:30  Phos  3.1       Mg     1.8         TPro  7.0  /  Alb  3.3  /  TBili  0.6  /  DBili  x   /  AST  85<H>  /  ALT  34  /  AlkPhos  83      PT/INR - ( 2023 05:30 )   PT: 13.2 sec;   INR: 1.11          PTT - ( 2023 05:30 )  PTT:29.8 sec  Urinalysis Basic - ( [address] [place] 2023 15:09 )    Color: Yellow / Appearance: Clear / S.020 / pH: x  Gluc: x / Ketone: NEGATIVE  / Bili: NEGATIVE / Urobili: 0.2 E.U./dL   Blood: x / Protein: NEGATIVE mg/dL / Nitrite: NEGATIVE   Leuk Esterase: NEGATIVE / RBC: < 5 /HPF / WBC < 5 /HPF   Sq Epi: x / Non Sq Epi: x / Bacteria: x      CAPILLARY BLOOD GLUCOSE      POCT Blood Glucose.: 149 mg/dL (15 [place] 2023 14:10)      RADIOLOGY & ADDITIONAL TESTS: Reviewed.   OVERNIGHT EVENTS: no acute events    SUBJECTIVE / INTERVAL HPI: Patient seen and examined at bedside. Patient attests to no new complaints overnight and is comfortable in bed. Spoke to patient about past surgical history but still was unable to provide details on the procedures he received. Patient mentioned a vascular procedure done on the right leg 6 months ago and is currently waiting for the same procedure on his left leg.     VITAL SIGNS:  Vital Signs Last 24 Hrs  T(C): 36.7 (2023 06:00), Max: 37.2 (2023 22:27)  T(F): 98.1 (2023 06:00), Max: 98.9 (2023 22:27)  HR: 96 (2023 06:00) (89 - 96)  BP: 138/84 (2023 06:00) (108/68 - 138/84)  BP(mean): --  RR: 19 (2023 06:00) (18 - 19)  SpO2: 98% (2023 06:00) (96% - 98%)    Parameters below as of 2023 06:00  Patient On (Oxygen Delivery Method): room air        PHYSICAL EXAM:    General: NAD, lying in bed comfortably   HEENT: Atraumatic, Normocephalic, EOMI, PERRLA, conjunctiva and sclera clear  Neck: Supple, No JVD  Cardiovascular: Regular rate and rhythm, no murmurs, rubs, or gallops  Respiratory: Clear to auscultation bilaterally, no rales, rhonchi, wheezing, or rubs. Unlabored respirations.   Gastrointestinal: Bowel sounds x4, tense and distended, no rebound tenderness,   Extremities: 2+ Peripheral Pulses, brisk cap refill, no clubbing, cyanosis, bilateral +1 lower extremity edema, left worse than right. Left lower extremity erythematous and warm to touch. Not exquisitely tender to palpation.   Neurological:  A&Ox3, no focal deficits. Generalized weakness, no asymmetry. Hands with bilateral contractures    MEDICATIONS:  MEDICATIONS  (STANDING):  artificial  tears Solution 1 Drop(s) Both EYES three times a day  bisacodyl 5 milliGRAM(s) Oral once  ceFAZolin   IVPB 2000 milliGRAM(s) IV Intermittent every 8 hours  dorzolamide 2%/timolol 0.5% Ophthalmic Solution 1 Drop(s) Both EYES two times a day  enoxaparin Injectable 40 milliGRAM(s) SubCutaneous every 24 hours  finasteride 5 milliGRAM(s) Oral daily  magnesium sulfate  IVPB 1 Gram(s) IV Intermittent once  melatonin 3 milliGRAM(s) Oral at bedtime  polyethylene glycol 3350 17 Gram(s) Oral daily  senna 2 Tablet(s) Oral at bedtime  tamsulosin 0.8 milliGRAM(s) Oral at bedtime    MEDICATIONS  (PRN):  acetaminophen     Tablet .. 650 milliGRAM(s) Oral every 6 hours PRN Temp greater or equal to 38C (100.4F), Mild Pain (1 - 3)  guaiFENesin Oral Liquid (Sugar-Free) 100 milliGRAM(s) Oral every 6 hours PRN Cough      ALLERGIES:  Allergies    No Known Allergies    Intolerances        LABS:                        11.0   8.15  )-----------( 176      ( 2023 05:30 )             35.1         136  |  103  |  9   ----------------------------<  119<H>  4.0   |  24  |  0.72    Ca    8.7      2023 05:30  Phos  3.1     -  Mg     1.8         TPro  7.0  /  Alb  3.3  /  TBili  0.6  /  DBili  x   /  AST  85<H>  /  ALT  34  /  AlkPhos  83  -17    PT/INR - ( 2023 05:30 )   PT: 13.2 sec;   INR: 1.11          PTT - ( 2023 05:30 )  PTT:29.8 sec  Urinalysis Basic - ( [address] [place] 2023 15:09 )    Color: Yellow / Appearance: Clear / S.020 / pH: x  Gluc: x / Ketone: NEGATIVE  / Bili: NEGATIVE / Urobili: 0.2 E.U./dL   Blood: x / Protein: NEGATIVE mg/dL / Nitrite: NEGATIVE   Leuk Esterase: NEGATIVE / RBC: < 5 /HPF / WBC < 5 /HPF   Sq Epi: x / Non Sq Epi: x / Bacteria: x      CAPILLARY BLOOD GLUCOSE      POCT Blood Glucose.: 149 mg/dL (15 [place] 2023 14:10)      RADIOLOGY & ADDITIONAL TESTS: Reviewed.   OVERNIGHT EVENTS: no acute events    SUBJECTIVE / INTERVAL HPI: Patient seen and examined at bedside. Patient attests to no new complaints overnight and is comfortable in bed. Spoke to patient about past surgical history but still was unable to provide details on the procedures he received. Patient mentioned a vascular procedure done on the right leg 6 months ago and is currently waiting for the same procedure on his left leg.     VITAL SIGNS:  Vital Signs Last 24 Hrs  T(C): 36.7 (2023 06:00), Max: 37.2 (2023 22:27)  T(F): 98.1 (2023 06:00), Max: 98.9 (2023 22:27)  HR: 96 (2023 06:00) (89 - 96)  BP: 138/84 (2023 06:00) (108/68 - 138/84)  BP(mean): --  RR: 19 (2023 06:00) (18 - 19)  SpO2: 98% (2023 06:00) (96% - 98%)    Parameters below as of 2023 06:00  Patient On (Oxygen Delivery Method): room air        PHYSICAL EXAM:  General: NAD, lying in bed comfortably   HEENT: Atraumatic, Normocephalic, EOMI, PERRLA, conjunctiva and sclera clear  Neck: Supple, No JVD  Cardiovascular: Regular rate and rhythm, no murmurs, rubs, or gallops  Respiratory: Clear to auscultation bilaterally, no rales, rhonchi, wheezing, or rubs. Unlabored respirations.   Gastrointestinal: Bowel sounds x4, tense and distended, no rebound tenderness,   Extremities: 2+ Peripheral Pulses, brisk cap refill, no clubbing, cyanosis, bilateral +1 lower extremity edema, left worse than right. Left lower extremity erythematous and warm to touch. Not exquisitely tender to palpation.   Neurological:  A&Ox3, no focal deficits. Generalized weakness, no asymmetry. Hands with bilateral contractures      MEDICATIONS:  MEDICATIONS  (STANDING):  artificial  tears Solution 1 Drop(s) Both EYES three times a day  bisacodyl 5 milliGRAM(s) Oral once  ceFAZolin   IVPB 2000 milliGRAM(s) IV Intermittent every 8 hours  dorzolamide 2%/timolol 0.5% Ophthalmic Solution 1 Drop(s) Both EYES two times a day  enoxaparin Injectable 40 milliGRAM(s) SubCutaneous every 24 hours  finasteride 5 milliGRAM(s) Oral daily  magnesium sulfate  IVPB 1 Gram(s) IV Intermittent once  melatonin 3 milliGRAM(s) Oral at bedtime  polyethylene glycol 3350 17 Gram(s) Oral daily  senna 2 Tablet(s) Oral at bedtime  tamsulosin 0.8 milliGRAM(s) Oral at bedtime    MEDICATIONS  (PRN):  acetaminophen     Tablet .. 650 milliGRAM(s) Oral every 6 hours PRN Temp greater or equal to 38C (100.4F), Mild Pain (1 - 3)  guaiFENesin Oral Liquid (Sugar-Free) 100 milliGRAM(s) Oral every 6 hours PRN Cough      ALLERGIES:  Allergies    No Known Allergies    Intolerances        LABS:                        11.0   8.15  )-----------( 176      ( 2023 05:30 )             35.1         136  |  103  |  9   ----------------------------<  119<H>  4.0   |  24  |  0.72    Ca    8.7      2023 05:30  Phos  3.1     -  Mg     1.8         TPro  7.0  /  Alb  3.3  /  TBili  0.6  /  DBili  x   /  AST  85<H>  /  ALT  34  /  AlkPhos  83  -17    PT/INR - ( 2023 05:30 )   PT: 13.2 sec;   INR: 1.11          PTT - ( 2023 05:30 )  PTT:29.8 sec  Urinalysis Basic - ( [address] [place] 2023 15:09 )    Color: Yellow / Appearance: Clear / S.020 / pH: x  Gluc: x / Ketone: NEGATIVE  / Bili: NEGATIVE / Urobili: 0.2 E.U./dL   Blood: x / Protein: NEGATIVE mg/dL / Nitrite: NEGATIVE   Leuk Esterase: NEGATIVE / RBC: < 5 /HPF / WBC < 5 /HPF   Sq Epi: x / Non Sq Epi: x / Bacteria: x      CAPILLARY BLOOD GLUCOSE      POCT Blood Glucose.: 149 mg/dL (15 [place] 2023 14:10)      RADIOLOGY & ADDITIONAL TESTS: Reviewed.

## 2023-01-17 NOTE — PROGRESS NOTE ADULT - PROBLEM SELECTOR PLAN 3
Pt with chronic b/l lower extremity edema for the past few years. He reports he had a cardiac workup and he was told he doesn't have heart failure. . Reports he saw a vascular surgeon at one point and a procedure was done but he doesn't know what and wasn't given any diagnosis or medications.  Unknown etiology of LE edema. Possibly PVD or lymphedema.   Takes furosemide 40mg daily at home.    Plan:  -Hold home furosemide and consider restarting if needed

## 2023-01-17 NOTE — PROGRESS NOTE ADULT - PROBLEM SELECTOR PLAN 4
Pt with chronic cough and dyspnea on exertion for past 1 year. Mildly productive of sputum. Former smoker. CXR with possible LLL atelectasis. He reports he was never told her had asthma or COPD. Upper airway noise on exam but no wheeze in lower airway. Low concern for infection. RVP negative.  Suspecting  undiagnosed COPD vs atelectasis from immobility.    Plan:  -Trial of duonebs  -Guaifenesin PRN  -PFTs as outpatient   -Incentive spirometer Pt with chronic cough and dyspnea on exertion for past 1 year. Mildly productive of sputum. Former smoker. CXR with possible LLL atelectasis. He reports he was never told her had asthma or COPD. Upper airway noise on exam but no wheeze in lower airway. Low concern for infection. RVP negative.  Suspecting  undiagnosed COPD vs atelectasis from immobility.   Wheezing has since resolved.     Plan:  -Trial of duonebs  -Guaifenesin PRN  -PFTs as outpatient   -Incentive spirometer Pt with chronic cough and dyspnea on exertion for past 1 year. Mildly productive of sputum. Former smoker. CXR with possible LLL atelectasis. He reports he was never told her had asthma or COPD. Upper airway noise on exam but no wheeze in lower airway. Low concern for infection. RVP negative.  Suspecting undiagnosed COPD vs atelectasis from immobility.   Wheezing has since resolved, satting well on RA.    Plan:  -Trial of duonebs  -Guaifenesin PRN  -PFTs as outpatient   -Incentive spirometer

## 2023-01-17 NOTE — CONSULT NOTE ADULT - ASSESSMENT
per Internal Medicine    72 year old male with PMH of BPH, cervical spine injury in 2018 presented with progressively worsening generalized weakness and bilateral lower extremity swelling (worse on the left), found to have LLE cellulitis.     Problem/Plan - 1:  ·  Problem: Cellulitis.   ·  Plan: Pt with chronic b/l lower extremity edema for the past few years, but worsening on the left side for the past 4 days with associated erythema and warm to touch. No tenderness to palpation. No open areas. No MRSA risk factors. No fever. Did have leukocytosis to 15 on admission, now resolved.  LE dopplers negative.    Plan:  -Cefazolin 2g q8 (1/16 - )  -f/u blood cultures.    Problem/Plan - 2:  ·  Problem: Generalized weakness.   ·  Plan: Pt with worsening generalized weakness since cervical surgery in 2018 (patient does not know details of surgery). Worse in upper extremities. Hands with b/l contractures. His wife and HHA assist with ADLs.     Plan:  -PT consult  -Hold home tizanidine 2mg in AM and 4mg in PM and gabapentin 100mg TID since pt reports they don't help. Can restart as needed.  -Need collateral on surgery   -No need for imaging or neuro consult at this time given no acute, new changes.    Problem/Plan - 3:  ·  Problem: Lower extremity edema.   ·  Plan: Pt with chronic b/l lower extremity edema for the past few years. He reports he had a cardiac workup and he was told he doesn't have heart failure. . Reports he saw a vascular surgeon at one point and a procedure was done but he doesn't know what and wasn't given any diagnosis or medications.  Unknown etiology of LE edema. Possibly PVD or lymphedema.   Takes furosemide 40mg daily at home.    Plan:  -Hold home furosemide and consider restarting if needed.    Problem/Plan - 4:  ·  Problem: Cough.   ·  Plan: Pt with chronic cough and dyspnea on exertion for past 1 year. Mildly productive of sputum. Former smoker. CXR with possible LLL atelectasis. He reports he was never told her had asthma or COPD. Upper airway noise on exam but no wheeze in lower airway. Low concern for infection. RVP negative.  Suspecting  undiagnosed COPD vs atelectasis from immobility.    Plan:  -Trial of duonebs  -Guaifenesin PRN  -PFTs as outpatient   -Incentive spirometer.    Problem/Plan - 5:  ·  Problem: Glaucoma.   ·  Plan: Pt with h/o glaucoma and unknown eye surgery on left side.    -continue with dorzolamide+timolol eye drops BID   -Hold prednisolone and Vyzulta (pt unsure which one goes in which eye, will need more collateral).    Problem/Plan - 6:  ·  Problem: Constipation.   ·  Plan: Pt with distended abdomen and chronic constipation at baseline.  -Senna and miralax    #Transaminitis   AST> ALT  -f/u AM hepatitis panel and RUQ ultrasound.    Problem/Plan - 7:  ·  Problem: BPH (benign prostatic hyperplasia).   ·  Plan: -c/w home finasteride 5mg daily and tamsulosin .8mg daily.    Problem/Plan - 8:  ·  Problem: Anemia.   ·  Plan: Hb 11 -12. Ferritin 419. Tsat 9.  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    Problem/Plan - 9:  ·  Problem: Prophylactic measure.   ·  Plan: F: None  E: Replete PRN  N: Regular  DVT: Lovenox  Code: FULL.

## 2023-01-17 NOTE — PROGRESS NOTE ADULT - PROBLEM SELECTOR PLAN 6
Pt with distended abdomen and chronic constipation at baseline.  -Senna and miralax    #Transaminitis   AST> ALT  -f/u AM hepatitis panel and RUQ ultrasound Pt with distended abdomen and chronic constipation at baseline.  -Senna and miralax    #Transaminitis   AST> ALT  RUQ U/S with hepatomegaly. Hepatitis panel negative

## 2023-01-17 NOTE — OCCUPATIONAL THERAPY INITIAL EVALUATION ADULT - LIVES WITH, PROFILE
Pt lives with wife in apt with no VELMA. Pt reports at baseline he requires assist for all ADLs and uses a wheelchair for all mobility. Pt able to perform transfers with RW and 1 person assist (wife or HHA0. Pt has HHA 5 days a week, unsure hours./spouse

## 2023-01-17 NOTE — PROGRESS NOTE ADULT - PROBLEM SELECTOR PLAN 7
-c/w home finasteride 5mg daily and tamsulosin .8mg daily -c/w home finasteride 5mg daily and tamsulosin 0.8mg daily

## 2023-01-17 NOTE — PROGRESS NOTE ADULT - PROBLEM SELECTOR PLAN 2
Pt with worsening generalized weakness since cervical surgery in 2018 (patient does not know details of surgery). Worse in upper extremities. Hands with b/l contractures. His wife and HHA assist with ADLs.     Plan:  -PT consult  -Hold home tizanidine 2mg in AM and 4mg in PM and gabapentin 100mg TID since pt reports they don't help. Can restart as needed.  -Need collateral on surgery   -No need for imaging or neuro consult at this time given no acute, new changes Pt with worsening generalized weakness since cervical surgery in 2018 (patient does not know details of surgery). Worse in upper extremities. Hands with b/l contractures. His wife and HHA assist with ADLs.     Plan:  -PT consult  -Hold home tizanidine 2mg in AM and 4mg in PM and gabapentin 100mg TID since pt reports they don't help. Can restart as needed.  -Need collateral on surgery   -No need for imaging or neuro consult at this time given no acute, new changes  - f/u MRI   - given exercise ball to work out fingers Pt with worsening generalized weakness since cervical surgery in 2018 (patient does not know details of surgery). Worse in upper extremities. Hands with b/l contractures. His wife and HHA assist with ADLs.     Plan:  - PT/OT consult, will follow recs  - Hold home tizanidine 2mg in AM and 4mg in PM and gabapentin 100mg TID since pt reports they don't help. Can restart as needed.  - Need collateral on surgery   - No need for imaging or neuro consult at this time given no acute, new changes  - f/u MRI spine  - given exercise ball to work out fingers

## 2023-01-17 NOTE — PROGRESS NOTE ADULT - PROBLEM SELECTOR PLAN 1
Pt with chronic b/l lower extremity edema for the past few years, but worsening on the left side for the past 4 days with associated erythema and warm to touch. No tenderness to palpation. No open areas. No MRSA risk factors. No fever. Did have leukocytosis to 15 on admission, now resolved.  LE dopplers negative.    Plan:  -Cefazolin 2g q8 (1/16 - )  -f/u blood cultures Pt with chronic b/l lower extremity edema for the past few years, but worsening on the left side for the past 4 days with associated erythema and warm to touch. No tenderness to palpation. No open areas. No MRSA risk factors. No fever. Did have leukocytosis to 15 on admission, now resolved.  LE dopplers negative.    Plan:  -Cefazolin 2g q8 (1/16 - )  -f/u blood cultures - growth on culture to date.   - will switch to PO cephalexin tmm. Pt with chronic b/l lower extremity edema for the past few years, but worsening on the left side for the past 4 days with associated erythema and warm to touch. No tenderness to palpation. No open areas. No MRSA risk factors. No fever. Admission WBC 15, now resolved.  LE dopplers negative.    Plan:  - Cefazolin 2g q8 (1/16 - )  - f/u blood cultures - no growth on culture to date.   - will switch to PO cephalexin 1/18.

## 2023-01-17 NOTE — OCCUPATIONAL THERAPY INITIAL EVALUATION ADULT - DIAGNOSIS, OT EVAL
Pt p/w chronic and residual B UE decrease in strength, ROM while demonstrating decrease in B LE ROM 2/2 swelling affecting ADLs and functional mobility

## 2023-01-17 NOTE — OCCUPATIONAL THERAPY INITIAL EVALUATION ADULT - ASSISTIVE DEVICE FOR TRANSFER: STAND/SIT, REHAB EVAL
Last filled 9/4/19 with 1 refill. Pt should have refill on file with pharmacy.    Callback to wife Milagro advising her of this. She will contact [facility] for refill.    rolling walker

## 2023-01-18 LAB
ALBUMIN SERPL ELPH-MCNC: 3.3 G/DL — SIGNIFICANT CHANGE UP (ref 3.3–5)
ALP SERPL-CCNC: 76 U/L — SIGNIFICANT CHANGE UP (ref 40–120)
ALT FLD-CCNC: 30 U/L — SIGNIFICANT CHANGE UP (ref 10–45)
ANION GAP SERPL CALC-SCNC: 8 MMOL/L — SIGNIFICANT CHANGE UP (ref 5–17)
AST SERPL-CCNC: 66 U/L — HIGH (ref 10–40)
BILIRUB SERPL-MCNC: 0.5 MG/DL — SIGNIFICANT CHANGE UP (ref 0.2–1.2)
BUN SERPL-MCNC: 9 MG/DL — SIGNIFICANT CHANGE UP (ref 7–23)
CALCIUM SERPL-MCNC: 9 MG/DL — SIGNIFICANT CHANGE UP (ref 8.4–10.5)
CHLORIDE SERPL-SCNC: 100 MMOL/L — SIGNIFICANT CHANGE UP (ref 96–108)
CO2 SERPL-SCNC: 27 MMOL/L — SIGNIFICANT CHANGE UP (ref 22–31)
CREAT SERPL-MCNC: 0.65 MG/DL — SIGNIFICANT CHANGE UP (ref 0.5–1.3)
EGFR: 100 ML/MIN/1.73M2 — SIGNIFICANT CHANGE UP
GLUCOSE SERPL-MCNC: 121 MG/DL — HIGH (ref 70–99)
HCT VFR BLD CALC: 36.6 % — LOW (ref 39–50)
HGB BLD-MCNC: 11.6 G/DL — LOW (ref 13–17)
MAGNESIUM SERPL-MCNC: 2 MG/DL — SIGNIFICANT CHANGE UP (ref 1.6–2.6)
MCHC RBC-ENTMCNC: 27.9 PG — SIGNIFICANT CHANGE UP (ref 27–34)
MCHC RBC-ENTMCNC: 31.7 GM/DL — LOW (ref 32–36)
MCV RBC AUTO: 88 FL — SIGNIFICANT CHANGE UP (ref 80–100)
NRBC # BLD: 0 /100 WBCS — SIGNIFICANT CHANGE UP (ref 0–0)
PHOSPHATE SERPL-MCNC: 2.8 MG/DL — SIGNIFICANT CHANGE UP (ref 2.5–4.5)
PLATELET # BLD AUTO: 212 K/UL — SIGNIFICANT CHANGE UP (ref 150–400)
POTASSIUM SERPL-MCNC: 4.4 MMOL/L — SIGNIFICANT CHANGE UP (ref 3.5–5.3)
POTASSIUM SERPL-SCNC: 4.4 MMOL/L — SIGNIFICANT CHANGE UP (ref 3.5–5.3)
PROT SERPL-MCNC: 7.5 G/DL — SIGNIFICANT CHANGE UP (ref 6–8.3)
RBC # BLD: 4.16 M/UL — LOW (ref 4.2–5.8)
RBC # FLD: 14.3 % — SIGNIFICANT CHANGE UP (ref 10.3–14.5)
SODIUM SERPL-SCNC: 135 MMOL/L — SIGNIFICANT CHANGE UP (ref 135–145)
WBC # BLD: 7.55 K/UL — SIGNIFICANT CHANGE UP (ref 3.8–10.5)
WBC # FLD AUTO: 7.55 K/UL — SIGNIFICANT CHANGE UP (ref 3.8–10.5)

## 2023-01-18 PROCEDURE — 99233 SBSQ HOSP IP/OBS HIGH 50: CPT

## 2023-01-18 PROCEDURE — 99221 1ST HOSP IP/OBS SF/LOW 40: CPT

## 2023-01-18 RX ORDER — GABAPENTIN 400 MG/1
100 CAPSULE ORAL EVERY 8 HOURS
Refills: 0 | Status: DISCONTINUED | OUTPATIENT
Start: 2023-01-18 | End: 2023-01-19

## 2023-01-18 RX ORDER — LATANOPROST 0.05 MG/ML
1 SOLUTION/ DROPS OPHTHALMIC; TOPICAL EVERY 24 HOURS
Refills: 0 | Status: DISCONTINUED | OUTPATIENT
Start: 2023-01-18 | End: 2023-01-19

## 2023-01-18 RX ORDER — CYCLOBENZAPRINE HYDROCHLORIDE 10 MG/1
10 TABLET, FILM COATED ORAL AT BEDTIME
Refills: 0 | Status: DISCONTINUED | OUTPATIENT
Start: 2023-01-18 | End: 2023-01-19

## 2023-01-18 RX ORDER — CYCLOBENZAPRINE HYDROCHLORIDE 10 MG/1
5 TABLET, FILM COATED ORAL EVERY 24 HOURS
Refills: 0 | Status: DISCONTINUED | OUTPATIENT
Start: 2023-01-19 | End: 2023-01-19

## 2023-01-18 RX ORDER — PREDNISOLONE SODIUM PHOSPHATE 1 %
1 DROPS OPHTHALMIC (EYE) EVERY 24 HOURS
Refills: 0 | Status: DISCONTINUED | OUTPATIENT
Start: 2023-01-18 | End: 2023-01-19

## 2023-01-18 RX ORDER — LACTULOSE 10 G/15ML
10 SOLUTION ORAL ONCE
Refills: 0 | Status: COMPLETED | OUTPATIENT
Start: 2023-01-18 | End: 2023-01-18

## 2023-01-18 RX ADMIN — Medication 1 DROP(S): at 17:57

## 2023-01-18 RX ADMIN — Medication 3 MILLIGRAM(S): at 21:09

## 2023-01-18 RX ADMIN — Medication 1 DROP(S): at 00:06

## 2023-01-18 RX ADMIN — DORZOLAMIDE HYDROCHLORIDE TIMOLOL MALEATE 1 DROP(S): 20; 5 SOLUTION/ DROPS OPHTHALMIC at 19:56

## 2023-01-18 RX ADMIN — Medication 1 DROP(S): at 14:29

## 2023-01-18 RX ADMIN — Medication 1 DROP(S): at 21:09

## 2023-01-18 RX ADMIN — CYCLOBENZAPRINE HYDROCHLORIDE 10 MILLIGRAM(S): 10 TABLET, FILM COATED ORAL at 21:09

## 2023-01-18 RX ADMIN — Medication 100 MILLIGRAM(S): at 22:44

## 2023-01-18 RX ADMIN — SENNA PLUS 2 TABLET(S): 8.6 TABLET ORAL at 21:09

## 2023-01-18 RX ADMIN — LATANOPROST 1 DROP(S): 0.05 SOLUTION/ DROPS OPHTHALMIC; TOPICAL at 17:57

## 2023-01-18 RX ADMIN — ENOXAPARIN SODIUM 40 MILLIGRAM(S): 100 INJECTION SUBCUTANEOUS at 19:57

## 2023-01-18 RX ADMIN — GABAPENTIN 100 MILLIGRAM(S): 400 CAPSULE ORAL at 17:57

## 2023-01-18 RX ADMIN — FINASTERIDE 5 MILLIGRAM(S): 5 TABLET, FILM COATED ORAL at 14:30

## 2023-01-18 RX ADMIN — Medication 100 MILLIGRAM(S): at 14:30

## 2023-01-18 RX ADMIN — DORZOLAMIDE HYDROCHLORIDE TIMOLOL MALEATE 1 DROP(S): 20; 5 SOLUTION/ DROPS OPHTHALMIC at 05:55

## 2023-01-18 RX ADMIN — TAMSULOSIN HYDROCHLORIDE 0.8 MILLIGRAM(S): 0.4 CAPSULE ORAL at 21:09

## 2023-01-18 RX ADMIN — Medication 100 MILLIGRAM(S): at 05:54

## 2023-01-18 RX ADMIN — Medication 1 DROP(S): at 05:55

## 2023-01-18 RX ADMIN — LACTULOSE 10 GRAM(S): 10 SOLUTION ORAL at 09:22

## 2023-01-18 RX ADMIN — POLYETHYLENE GLYCOL 3350 17 GRAM(S): 17 POWDER, FOR SOLUTION ORAL at 14:30

## 2023-01-18 NOTE — PROGRESS NOTE ADULT - PROBLEM SELECTOR PLAN 9
F: None  E: Replenish PRN  N: Regular  DVT: Lovenox  Code: FULL F: None  E: Replenish PRN  N: Regular  DVT: Lovenox  Code: FULL  Dispo: [facility] pending [facility]

## 2023-01-18 NOTE — PROGRESS NOTE ADULT - ASSESSMENT
72 year old male with PMH of BPH, cervical spine injury in 2018 presented with progressively worsening generalized weakness and b/l lower extremity swelling (L>R), found to have LLE cellulitis.

## 2023-01-18 NOTE — PROGRESS NOTE ADULT - SUBJECTIVE AND OBJECTIVE BOX
OVERNIGHT EVENTS:     SUBJECTIVE / INTERVAL HPI: Patient seen and examined at bedside.     VITAL SIGNS:  Vital Signs Last 24 Hrs  T(C): 37.2 (18 Jan 2023 09:32), Max: 37.2 (18 Jan 2023 09:32)  T(F): 98.9 (18 Jan 2023 09:32), Max: 98.9 (18 Jan 2023 09:32)  HR: 94 (18 Jan 2023 09:32) (92 - 103)  BP: 160/91 (18 Jan 2023 09:32) (143/84 - 162/92)  BP(mean): --  RR: 18 (18 Jan 2023 09:32) (17 - 18)  SpO2: 97% (18 Jan 2023 09:32) (94% - 97%)    Parameters below as of 18 Jan 2023 09:32  Patient On (Oxygen Delivery Method): room air      PHYSICAL EXAM:  General: NAD, lying in bed comfortably   HEENT: Atraumatic, Normocephalic, EOMI, PERRLA, conjunctiva and sclera clear  Neck: Supple, No JVD  Cardiovascular: Regular rate and rhythm, no murmurs, rubs, or gallops  Respiratory: Clear to auscultation bilaterally, no rales, rhonchi, wheezing, or rubs. Unlabored respirations.   Gastrointestinal: Bowel sounds x4, tense and distended, no rebound tenderness,   Extremities: 2+ Peripheral Pulses, brisk cap refill, no clubbing, cyanosis, bilateral +1 lower extremity edema, left worse than right. Left lower extremity erythematous and warm to touch. Not exquisitely tender to palpation.   Neurological:  A&Ox3, no focal deficits. Generalized weakness, no asymmetry. Hands with bilateral contractures      MEDICATIONS:  MEDICATIONS  (STANDING):  artificial  tears Solution 1 Drop(s) Both EYES three times a day  ceFAZolin   IVPB 2000 milliGRAM(s) IV Intermittent every 8 hours  dorzolamide 2%/timolol 0.5% Ophthalmic Solution 1 Drop(s) Both EYES two times a day  enoxaparin Injectable 40 milliGRAM(s) SubCutaneous every 24 hours  finasteride 5 milliGRAM(s) Oral daily  melatonin 3 milliGRAM(s) Oral at bedtime  polyethylene glycol 3350 17 Gram(s) Oral daily  senna 2 Tablet(s) Oral at bedtime  tamsulosin 0.8 milliGRAM(s) Oral at bedtime    MEDICATIONS  (PRN):  acetaminophen     Tablet .. 650 milliGRAM(s) Oral every 6 hours PRN Temp greater or equal to 38C (100.4F), Mild Pain (1 - 3)  guaiFENesin Oral Liquid (Sugar-Free) 100 milliGRAM(s) Oral every 6 hours PRN Cough      ALLERGIES:  Allergies    No Known Allergies    Intolerances        LABS:                        11.6   7.55  )-----------( 212      ( 18 Jan 2023 05:30 )             36.6     01-18    135  |  100  |  9   ----------------------------<  121<H>  4.4   |  27  |  0.65    Ca    9.0      18 Jan 2023 05:30  Phos  2.8     01-18  Mg     2.0     01-18    TPro  7.5  /  Alb  3.3  /  TBili  0.5  /  DBili  x   /  AST  66<H>  /  ALT  30  /  AlkPhos  76  01-18    PT/INR - ( 17 Jan 2023 05:30 )   PT: 13.2 sec;   INR: 1.11          PTT - ( 17 Jan 2023 05:30 )  PTT:29.8 sec    CAPILLARY BLOOD GLUCOSE          RADIOLOGY & ADDITIONAL TESTS: Reviewed.   OVERNIGHT EVENTS: no acute events    SUBJECTIVE / INTERVAL HPI: Patient seen and examined at bedside. Patient is feeling less weak than yesterday, also notes left leg is smaller and feeling better. Denies fever, chills, HA, CP, SOB, n/v, changes in bowel/urinary habits. 12 pt ROS otherwise negative.     VITAL SIGNS:  Vital Signs Last 24 Hrs  T(C): 37.2 (18 Jan 2023 09:32), Max: 37.2 (18 Jan 2023 09:32)  T(F): 98.9 (18 Jan 2023 09:32), Max: 98.9 (18 Jan 2023 09:32)  HR: 94 (18 Jan 2023 09:32) (92 - 103)  BP: 160/91 (18 Jan 2023 09:32) (143/84 - 162/92)  BP(mean): --  RR: 18 (18 Jan 2023 09:32) (17 - 18)  SpO2: 97% (18 Jan 2023 09:32) (94% - 97%)    Parameters below as of 18 Jan 2023 09:32  Patient On (Oxygen Delivery Method): room air      PHYSICAL EXAM:  General: NAD, lying in bed comfortably   HEENT: Atraumatic, Normocephalic, EOMI, PERRLA, conjunctiva and sclera clear  Neck: Supple, No JVD  Cardiovascular: Regular rate and rhythm, no murmurs, rubs, or gallops  Respiratory: Clear to auscultation bilaterally, no rales, rhonchi, wheezing, or rubs. Unlabored respirations.   Gastrointestinal: Bowel sounds x4, tense and distended, no rebound tenderness,   Extremities: 2+ Peripheral Pulses, brisk cap refill, no clubbing, cyanosis, bilateral +1 lower extremity edema, L>R. LLE erythematous and warm to touch. Nontender.   Neurological:  A&Ox3, no focal deficits. Generalized weakness, no asymmetry. Hands with bilateral contractures      MEDICATIONS:  MEDICATIONS  (STANDING):  artificial  tears Solution 1 Drop(s) Both EYES three times a day  ceFAZolin   IVPB 2000 milliGRAM(s) IV Intermittent every 8 hours  dorzolamide 2%/timolol 0.5% Ophthalmic Solution 1 Drop(s) Both EYES two times a day  enoxaparin Injectable 40 milliGRAM(s) SubCutaneous every 24 hours  finasteride 5 milliGRAM(s) Oral daily  melatonin 3 milliGRAM(s) Oral at bedtime  polyethylene glycol 3350 17 Gram(s) Oral daily  senna 2 Tablet(s) Oral at bedtime  tamsulosin 0.8 milliGRAM(s) Oral at bedtime    MEDICATIONS  (PRN):  acetaminophen     Tablet .. 650 milliGRAM(s) Oral every 6 hours PRN Temp greater or equal to 38C (100.4F), Mild Pain (1 - 3)  guaiFENesin Oral Liquid (Sugar-Free) 100 milliGRAM(s) Oral every 6 hours PRN Cough      ALLERGIES:  Allergies    No Known Allergies    Intolerances        LABS:                        11.6   7.55  )-----------( 212      ( 18 Jan 2023 05:30 )             36.6     01-18    135  |  100  |  9   ----------------------------<  121<H>  4.4   |  27  |  0.65    Ca    9.0      18 Jan 2023 05:30  Phos  2.8     01-18  Mg     2.0     01-18    TPro  7.5  /  Alb  3.3  /  TBili  0.5  /  DBili  x   /  AST  66<H>  /  ALT  30  /  AlkPhos  76  01-18    PT/INR - ( 17 Jan 2023 05:30 )   PT: 13.2 sec;   INR: 1.11          PTT - ( 17 Jan 2023 05:30 )  PTT:29.8 sec    CAPILLARY BLOOD GLUCOSE          RADIOLOGY & ADDITIONAL TESTS: Reviewed.

## 2023-01-18 NOTE — PROGRESS NOTE ADULT - PROBLEM SELECTOR PLAN 4
Pt with chronic cough and dyspnea on exertion for past 1 year. Mildly productive of sputum. Former smoker. CXR with possible LLL atelectasis. He reports he was never told her had asthma or COPD. Upper airway noise on exam but no wheeze in lower airway. Low concern for infection. RVP negative.  Suspecting undiagnosed COPD vs atelectasis from immobility.   Wheezing has since resolved, satting well on RA.    Plan:  -Trial of duonebs  -Guaifenesin PRN  -PFTs as outpatient   -Incentive spirometer

## 2023-01-18 NOTE — PHYSICAL THERAPY INITIAL EVALUATION ADULT - ADDITIONAL COMMENTS
Pt lives with wife in apt with no VELMA. Pt reports at baseline he requires assist for all ADLs and uses a wheelchair for all mobility. Pt able to perform transfers with RW and 1 person assist (wife or HHA0. Pt has HHA 5 days a week, unsure hours

## 2023-01-18 NOTE — PROGRESS NOTE ADULT - PROBLEM SELECTOR PLAN 1
Pt with chronic b/l lower extremity edema for the past few years, but worsening on the left side for the past 4 days with associated erythema and warm to touch. No tenderness to palpation. No open areas. No MRSA risk factors. No fever. Admission WBC 15, now resolved.  LE dopplers negative.    Plan:  - Cefazolin 2g q8 (1/16 - )  - f/u blood cultures - no growth on culture to date.   - will switch to PO cephalexin 1/18. Pt with chronic b/l lower extremity edema for the past few years, but worsening on the left side for the past 4 days with associated erythema and warm to touch. No tenderness to palpation. No open areas. No MRSA risk factors. No fever. Admission WBC 15, now resolved.  LE dopplers negative.    Plan:  - Cefazolin 2g q8 (1/16 - )  - f/u blood cultures - no growth on culture to date.   - will switch to PO cephalexin on dc.

## 2023-01-18 NOTE — PROGRESS NOTE ADULT - PROBLEM SELECTOR PLAN 6
Pt with distended abdomen and chronic constipation at baseline.  -Senna and miralax    #Transaminitis   AST> ALT  RUQ U/S with hepatomegaly. Hepatitis panel negative

## 2023-01-18 NOTE — PROGRESS NOTE ADULT - ATTENDING COMMENTS
72M with PMH of prior cervical spine injury s/p surgery and BPH presenting with lower extremity swelling, noted to have cellulitis of the left leg.     Physical exam  General: no acute distress, sitting up in bed  HEENT: normocephalic, atraumatic, MMM  Cards: RRR, normal S1/S2, no murmurs, rubs or gallops  Pulm: CTAB, no wheeze, crackles, rales or rhonchi  Ab: mildly distended, soft, normoactive bowel sounds, nontender  Ext: chronic lower extremity swelling, L>R, showing improvement since starting antibiotics  Neuro: chronic bilateral hand contractures, limited range of motion in both arms, no tenderness to palpation of spine  Skin: warm and dry, no obvious rashes or lesions    - transition to keflex today, given significant improvement  - lower extremity dopplers negative for DVT  - MRI spine with concern for focus of osteomyelitis - getting spine consult  -recommended for [facility] - start screening process    >55 minutes spent on this encounter including face to face with patient, documentation and care coordination.
72M with PMH of prior cervical spine injury s/p surgery and BPH presenting with lower extremity swelling, noted to have cellulitis of the left leg.     Physical exam  General: no acute distress, sitting up in bed  HEENT: normocephalic, atraumatic, MMM  Cards: RRR, normal S1/S2, no murmurs, rubs or gallops  Pulm: CTAB, no wheeze, crackles, rales or rhonchi  Ab: mildly distended, soft, normoactive bowel sounds, nontender  Ext: chronic lower extremity swelling, L>R  Neuro: chronic bilateral hand contractures, limited range of motion in both arms, no tenderness to palpation of spine  Skin: warm and dry, no obvious rashes or lesions    - continue with cefazolin and transition to keflex tomorrow, given significant improvement  - lower extremity dopplers negative for DVT  - pending MRI of the spine, due to concern for progressive weakness.  Patient does report that he was not able to follow up with physical therapy after his spine surgery after the pandemic started  - PT and OT consult    >55 minutes spent on this encounter including face to face with patient, documentation and care coordination.

## 2023-01-18 NOTE — PHYSICAL THERAPY INITIAL EVALUATION ADULT - PERTINENT HX OF CURRENT PROBLEM, REHAB EVAL
72 year old male with PMH of BPH, cervical spine injury in 2018 presented to [facility] with his wife on 1/15 with complaints of progressively worsening generalized weakness and bilateral lower extremity swelling (worse on the left). He had a fall in 2018 and required cervical spine surgery. After the surgery, he reports he had worsening weakness, especially in his upper extremities. He had difficulty getting physical therapy after the pandemic and his hands became contracted during that time. His overall mobility and functional capacity has progressively declined since the surgery. He reports some dyspnea with exertion and a productive cough which has been going on for months. He is a former smoker, but never diagnosed with COPD. He has had worsening bilateral lower extremity edema in recent months, but his left lower extremity below the knee has become more swollen, warm, and pink in the past four days. It's not severely painful and he hasn't taken anything for it. No recent trauma to the area.

## 2023-01-18 NOTE — PROGRESS NOTE ADULT - PROBLEM SELECTOR PLAN 3
Pt with chronic b/l lower extremity edema for the past few years. He reports he had a cardiac workup and he was told he doesn't have heart failure. . Reports he saw a vascular surgeon at one point and a procedure was done but he doesn't know what and wasn't given any diagnosis or medications.  Unknown etiology of LE edema. Possibly PVD or lymphedema.   Takes furosemide 40mg daily at home.    Plan:  -Hold home furosemide and consider restarting if needed Pt with chronic b/l lower extremity edema for the past few years. He reports he had a cardiac workup and he was told he doesn't have heart failure. . Reports he saw a vascular surgeon at one point and a procedure was done but he doesn't know what and wasn't given any diagnosis or medications.  Unknown etiology of LE edema. Possibly PVD or lymphedema.   Takes furosemide 40mg qd at home.    Plan:  -Hold home furosemide and consider restarting if needed

## 2023-01-18 NOTE — PROGRESS NOTE ADULT - PROBLEM SELECTOR PLAN 5
Pt with h/o glaucoma and unknown eye surgery on left side.    -continue with dorzolamide+timolol eye drops BID   -Hold prednisolone and Vyzulta (pt unsure which one goes in which eye, will need more collateral)  - collateral from Dr. Laird, ophthalmologist

## 2023-01-18 NOTE — CONSULT NOTE ADULT - SUBJECTIVE AND OBJECTIVE BOX
INCOMPLETE NOTE:    Orthopaedic Surgery Consult Note    HPI:  The patient is a 72 year old male with history of prior cervical spine surgery, consulted by ortho for cervical/thoracic MRI findings concerning for possible osteomyelitis. Patient is a somewhat poor historian - per patient he had a fall in ~2018 and required subsequent cervical spine surgery. At baseline he states he has generalized weakness and lower extremity edema; he mostly uses a wheelchair for ambulation and has his wife at home who assist with standing/transfers. The patient states that over the last week he has been having "heaviness"/swelling of his legs and feeling weaker than usual. He came to the ED on 1/15/22 for further evaluation. Denies numbness/tingling, fall or trauma.     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)    FAMILY HISTORY:  non-contributory     Medications:   Per medicine note:   MEDICATIONS  (STANDING):  artificial  tears Solution 1 Drop(s) Both EYES three times a day  ceFAZolin   IVPB 2000 milliGRAM(s) IV Intermittent every 8 hours  dorzolamide 2%/timolol 0.5% Ophthalmic Solution 1 Drop(s) Both EYES two times a day  enoxaparin Injectable 40 milliGRAM(s) SubCutaneous every 24 hours  finasteride 5 milliGRAM(s) Oral daily  melatonin 3 milliGRAM(s) Oral at bedtime  polyethylene glycol 3350 17 Gram(s) Oral daily  senna 2 Tablet(s) Oral at bedtime  tamsulosin 0.8 milliGRAM(s) Oral at bedtime    MEDICATIONS  (PRN):  acetaminophen     Tablet .. 650 milliGRAM(s) Oral every 6 hours PRN Temp greater or equal to 38C (100.4F), Mild Pain (1 - 3)  guaiFENesin Oral Liquid (Sugar-Free) 100 milliGRAM(s) Oral every 6 hours PRN Cough      Vital Signs Last 24 Hrs  T(C): 37.2 (18 Jan 2023 09:32), Max: 37.2 (18 Jan 2023 09:32)  T(F): 98.9 (18 Jan 2023 09:32), Max: 98.9 (18 Jan 2023 09:32)  HR: 94 (18 Jan 2023 09:32) (93 - 103)  BP: 160/91 (18 Jan 2023 09:32) (144/86 - 162/92)  BP(mean): --  RR: 18 (18 Jan 2023 09:32) (17 - 18)  SpO2: 97% (18 Jan 2023 09:32) (94% - 97%)    Parameters below as of 18 Jan 2023 09:32  Patient On (Oxygen Delivery Method): room air    PE:  Constitutional: vitals reviewed per nursing documentation, NAD, lying comfortably in bed/stretcher  Eyes: no obvious deformity, ecchymoses, swelling of eyelids  Neck: trachea midline; posterior neck with well healed surgical scar  Lungs: not using accessory muscles of respiration, normal respiratory effort  Neuro/Psych: normal affect and mood  Back: well healed surgical scar to posterior neck. No open wounds  MSK:                           11.6   7.55  )-----------( 212      ( 18 Jan 2023 05:30 )             36.6     01-18    135  |  100  |  9   ----------------------------<  121<H>  4.4   |  27  |  0.65    Ca    9.0      18 Jan 2023 05:30  Phos  2.8     01-18  Mg     2.0     01-18    TPro  7.5  /  Alb  3.3  /  TBili  0.5  /  DBili  x   /  AST  66<H>  /  ALT  30  /  AlkPhos  76  01-18    PT/INR - ( 17 Jan 2023 05:30 )   PT: 13.2 sec;   INR: 1.11          PTT - ( 17 Jan 2023 05:30 )  PTT:29.8 sec    Imaging:     A/P: 72yMale      Reviewed imaging and lab data   Risks and benefits were discussed for   Above plan discussed with Dr. Shafer Pager [phone]    ___ minutes spent on total encounter, over 50% of the visit was spent counseling and/or coordinating care.    Orthopaedic Surgery Consult Note    HPI:  The patient is a 72 year old male with history of prior cervical spine surgery, consulted by ortho for cervical/thoracic MRI findings concerning for possible osteomyelitis. Patient is a somewhat poor historian - per patient he had a fall in ~2018 and required subsequent cervical spine surgery. At baseline he states he has generalized weakness and lower extremity edema; he mostly uses a wheelchair for ambulation and has his wife at home who assist with standing/transfers. The patient states that over the last week he has been having "heaviness"/swelling of his legs and feeling weaker than usual. He came to the ED on 1/15/22 for further evaluation. Denies numbness/tingling, fall or trauma.     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)    FAMILY HISTORY:  non-contributory     Medications:   Per medicine note:   MEDICATIONS  (STANDING):  artificial  tears Solution 1 Drop(s) Both EYES three times a day  ceFAZolin   IVPB 2000 milliGRAM(s) IV Intermittent every 8 hours  dorzolamide 2%/timolol 0.5% Ophthalmic Solution 1 Drop(s) Both EYES two times a day  enoxaparin Injectable 40 milliGRAM(s) SubCutaneous every 24 hours  finasteride 5 milliGRAM(s) Oral daily  melatonin 3 milliGRAM(s) Oral at bedtime  polyethylene glycol 3350 17 Gram(s) Oral daily  senna 2 Tablet(s) Oral at bedtime  tamsulosin 0.8 milliGRAM(s) Oral at bedtime    MEDICATIONS  (PRN):  acetaminophen     Tablet .. 650 milliGRAM(s) Oral every 6 hours PRN Temp greater or equal to 38C (100.4F), Mild Pain (1 - 3)  guaiFENesin Oral Liquid (Sugar-Free) 100 milliGRAM(s) Oral every 6 hours PRN Cough      Vital Signs Last 24 Hrs  T(C): 37.2 (18 Jan 2023 09:32), Max: 37.2 (18 Jan 2023 09:32)  T(F): 98.9 (18 Jan 2023 09:32), Max: 98.9 (18 Jan 2023 09:32)  HR: 94 (18 Jan 2023 09:32) (93 - 103)  BP: 160/91 (18 Jan 2023 09:32) (144/86 - 162/92)  BP(mean): --  RR: 18 (18 Jan 2023 09:32) (17 - 18)  SpO2: 97% (18 Jan 2023 09:32) (94% - 97%)    Parameters below as of 18 Jan 2023 09:32  Patient On (Oxygen Delivery Method): room air    PE:  Constitutional: vitals reviewed per nursing documentation, NAD, lying comfortably in bed/stretcher  Eyes: no obvious deformity, ecchymoses, swelling of eyelids  Neck: trachea midline; posterior neck with well healed surgical scar  Lungs: not using accessory muscles of respiration, normal respiratory effort  Neuro/Psych: normal affect and mood  Back: well healed surgical scar to posterior neck. No open wounds, lesions, erythema to back. Cervical/thoracic/lumbar spine non-tender to palpation.   MSK:   Skin warm and well perfused. bilateral hand contractures noted. Sensation intact/equal to bilateral upper/lower extremities. DP pulses palpable bilateral lower extremities. Fires biceps/triceps 5/5 bilateral upper extremities. Difficult to test bilateral  strength 2/2 chronic contractures. Patient with initially some difficult following commands on exam, when encouraged to participate/focus,  fires EHL/TA/GS/FHL 5/5 bilateral lower extremities. Quads/hamstrings 5/5 bilateral lower extremities.                              11.6   7.55  )-----------( 212      ( 18 Jan 2023 05:30 )             36.6     01-18    135  |  100  |  9   ----------------------------<  121<H>  4.4   |  27  |  0.65    Ca    9.0      18 Jan 2023 05:30  Phos  2.8     01-18  Mg     2.0     01-18    TPro  7.5  /  Alb  3.3  /  TBili  0.5  /  DBili  x   /  AST  66<H>  /  ALT  30  /  AlkPhos  76  01-18    PT/INR - ( 17 Jan 2023 05:30 )   PT: 13.2 sec;   INR: 1.11       PTT - ( 17 Jan 2023 05:30 )  PTT:29.8 sec    Imaging:   < from: MR Cervical Spine No Cont (01.17.23 @ 21:43) >  IMPRESSION:  1. A previous cervical laminectomies and posterior fusion C3 through C6   with  hardware metal artifacts limiting the evaluation.  Cervical spine  degeneration, individual levels, see above.  2. A heterogeneous signal greatest C3 through C6 vertebral bodies, could   be  chronic postsurgical changes, nonspecific marrow replacement process,  differential also includes infection, osteomyelitis or sequela of  osteomyelitis, recommend clinical correlation correlation with laboratory  values.  3. There is cord atrophy and central cord signal at C4-C5 levels.    --- End of Report ---    CYNTHIA CARPENTER MD; Attending Radiologist  This document has been electronically signed. Jan 18 2023 10:08AM    < end of copied text >    < from: MR Thoracic Spine No Cont (01.17.23 @ 21:43) >    IMPRESSION:  1. A well-defined lesions, the largest within the T3 vertebral body, may  represent a benign hemangiomas, may correlate with nuclear medicine bone   scan.  2. A heterogeneous signal of the visualized C6, C7, also signal of T1-T2  vertebral bodies anterior endplates, a differential includes not limited   to a  superimposed infection, developing osteomyelitis or a sequela of remote  osteomyelitis, a progressing endplate changes, among other entities.    --- End of Report ---    CYNTHIA CARPENTER MD; Attending Radiologist  This document has been electronically signed. Jan 18 2023 10:17AM    < end of copied text >    A/P: 72yMale admitted for bilateral lower extremity cellulitis with cervical/thoracic MRI showing "A heterogeneous signal of the visualized C6, C7, also signal of T1-T2  vertebral bodies anterior endplates"  -Discussed with Dr. Nunn; MRI C/T/L spine reviewed by Dr. Nunn - per Dr. Nunn, signal pattern on MRI is more consistent with post-operative changes rather than osteomyelitis. No acute orthopedic spine intervention at this time; no concern for osteomyelitis.   -Continue antibiotic treatment for cellulitis as per primary team.     Farida Hurtado PA-C  Orthopaedic Surgery Consult Note    HPI:  The patient is a 72 year old male with history of prior cervical spine surgery, consulted by ortho for cervical/thoracic MRI findings concerning for possible osteomyelitis. Patient is a somewhat poor historian - per patient he had a fall in ~2018 and required subsequent cervical spine surgery. At baseline he states he has generalized weakness and lower extremity edema; he mostly uses a wheelchair for ambulation and has his wife at home who assist with standing/transfers. The patient states that over the last week he has been having "heaviness"/swelling of his legs and feeling weaker than usual. He came to the ED on 1/15/22 for further evaluation. Denies numbness/tingling, fall or trauma.     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)    FAMILY HISTORY:  non-contributory     Medications:   Per medicine note:   MEDICATIONS  (STANDING):  artificial  tears Solution 1 Drop(s) Both EYES three times a day  ceFAZolin   IVPB 2000 milliGRAM(s) IV Intermittent every 8 hours  dorzolamide 2%/timolol 0.5% Ophthalmic Solution 1 Drop(s) Both EYES two times a day  enoxaparin Injectable 40 milliGRAM(s) SubCutaneous every 24 hours  finasteride 5 milliGRAM(s) Oral daily  melatonin 3 milliGRAM(s) Oral at bedtime  polyethylene glycol 3350 17 Gram(s) Oral daily  senna 2 Tablet(s) Oral at bedtime  tamsulosin 0.8 milliGRAM(s) Oral at bedtime    MEDICATIONS  (PRN):  acetaminophen     Tablet .. 650 milliGRAM(s) Oral every 6 hours PRN Temp greater or equal to 38C (100.4F), Mild Pain (1 - 3)  guaiFENesin Oral Liquid (Sugar-Free) 100 milliGRAM(s) Oral every 6 hours PRN Cough      Vital Signs Last 24 Hrs  T(C): 37.2 (18 Jan 2023 09:32), Max: 37.2 (18 Jan 2023 09:32)  T(F): 98.9 (18 Jan 2023 09:32), Max: 98.9 (18 Jan 2023 09:32)  HR: 94 (18 Jan 2023 09:32) (93 - 103)  BP: 160/91 (18 Jan 2023 09:32) (144/86 - 162/92)  BP(mean): --  RR: 18 (18 Jan 2023 09:32) (17 - 18)  SpO2: 97% (18 Jan 2023 09:32) (94% - 97%)    Parameters below as of 18 Jan 2023 09:32  Patient On (Oxygen Delivery Method): room air    PE:  Constitutional: vitals reviewed per nursing documentation, NAD, lying comfortably in bed/stretcher  Eyes: no obvious deformity, ecchymoses, swelling of eyelids  Neck: trachea midline; posterior neck with well healed surgical scar  Lungs: not using accessory muscles of respiration, normal respiratory effort  Neuro/Psych: normal affect and mood  Back: well healed surgical scar to posterior neck. No open wounds, lesions, erythema to back. Cervical/thoracic/lumbar spine non-tender to palpation.   MSK:   Skin warm and well perfused. bilateral hand contractures noted. +swelling to bilateral lower extremities, left worse than right. left lower extremity with some diffuse erythema, warm to touch.  Sensation intact/equal to bilateral upper/lower extremities. DP pulses palpable bilateral lower extremities. Fires biceps/triceps 5/5 bilateral upper extremities. Difficult to test bilateral  strength 2/2 chronic contractures. Patient with initially some difficult following commands on exam, when encouraged to participate/focus,  fires EHL/TA/GS/FHL 5/5 bilateral lower extremities. Quads/hamstrings 5/5 bilateral lower extremities.                              11.6   7.55  )-----------( 212      ( 18 Jan 2023 05:30 )             36.6     01-18    135  |  100  |  9   ----------------------------<  121<H>  4.4   |  27  |  0.65    Ca    9.0      18 Jan 2023 05:30  Phos  2.8     01-18  Mg     2.0     01-18    TPro  7.5  /  Alb  3.3  /  TBili  0.5  /  DBili  x   /  AST  66<H>  /  ALT  30  /  AlkPhos  76  01-18    PT/INR - ( 17 Jan 2023 05:30 )   PT: 13.2 sec;   INR: 1.11       PTT - ( 17 Jan 2023 05:30 )  PTT:29.8 sec    Imaging:   < from: MR Cervical Spine No Cont (01.17.23 @ 21:43) >  IMPRESSION:  1. A previous cervical laminectomies and posterior fusion C3 through C6   with  hardware metal artifacts limiting the evaluation.  Cervical spine  degeneration, individual levels, see above.  2. A heterogeneous signal greatest C3 through C6 vertebral bodies, could   be  chronic postsurgical changes, nonspecific marrow replacement process,  differential also includes infection, osteomyelitis or sequela of  osteomyelitis, recommend clinical correlation correlation with laboratory  values.  3. There is cord atrophy and central cord signal at C4-C5 levels.    --- End of Report ---    CYNTHIA CARPENTER MD; Attending Radiologist  This document has been electronically signed. Jan 18 2023 10:08AM    < end of copied text >    < from: MR Thoracic Spine No Cont (01.17.23 @ 21:43) >    IMPRESSION:  1. A well-defined lesions, the largest within the T3 vertebral body, may  represent a benign hemangiomas, may correlate with nuclear medicine bone   scan.  2. A heterogeneous signal of the visualized C6, C7, also signal of T1-T2  vertebral bodies anterior endplates, a differential includes not limited   to a  superimposed infection, developing osteomyelitis or a sequela of remote  osteomyelitis, a progressing endplate changes, among other entities.    --- End of Report ---    CYNTHIA CARPENTER MD; Attending Radiologist  This document has been electronically signed. Jan 18 2023 10:17AM    < end of copied text >    A/P: 72yMale admitted for bilateral lower extremity cellulitis with cervical/thoracic MRI showing "A heterogeneous signal of the visualized C6, C7, also signal of T1-T2  vertebral bodies anterior endplates"  -Discussed with Dr. Nunn; MRI C/T/L spine reviewed by Dr. Nunn - per Dr. Nunn, signal pattern on MRI is more consistent with post-operative changes rather than osteomyelitis. No acute orthopedic spine intervention at this time; no concern for osteomyelitis.   -Continue antibiotic treatment for cellulitis as per primary team.     Farida Hurtado PA-C

## 2023-01-18 NOTE — PROGRESS NOTE ADULT - PROBLEM SELECTOR PLAN 2
Pt with worsening generalized weakness since cervical surgery in 2018 (patient does not know details of surgery). Worse in upper extremities. Hands with b/l contractures. His wife and HHA assist with ADLs.     Plan:  - PT/OT consult, will follow recs  - Hold home tizanidine 2mg in AM and 4mg in PM and gabapentin 100mg TID since pt reports they don't help. Can restart as needed.  - Need collateral on surgery   - No need for imaging or neuro consult at this time given no acute, new changes  - f/u MRI spine  - given exercise ball to work out fingers Pt with worsening generalized weakness since cervical surgery in 2018 (patient does not know details of surgery). Worse in upper extremities. Hands with b/l contractures. His wife and HHA assist with ADLs.  PT/OT recommend [facility]  MRI shows post-surgical spine changes, cannot rule out osteomyelitis. Ortho spine consulted to comment.    Plan:  - Hold home tizanidine 2mg in AM and 4mg in PM and gabapentin 100mg TID since pt reports they don't help. Can restart as needed.  - Need collateral on surgery   - No need for imaging or neuro consult at this time given no acute, new changes  - given exercise ball to work out fingers

## 2023-01-18 NOTE — PROGRESS NOTE ADULT - PROBLEM SELECTOR PLAN 8
Hb 11 -12. Ferritin 419. Tsat 9.  Likely mixed AoCD and iron deficiency     -consider starting IV iron inpatient (would avoid oral iron given constipation)
Hb 11 -12. Ferritin 419. Tsat 9.  Likely mixed AoCD and iron deficiency     -consider starting IV iron inpatient (would avoid oral iron given constipation)

## 2023-01-19 VITALS
DIASTOLIC BLOOD PRESSURE: 88 MMHG | SYSTOLIC BLOOD PRESSURE: 135 MMHG | HEART RATE: 84 BPM | TEMPERATURE: 98 F | RESPIRATION RATE: 19 BRPM | OXYGEN SATURATION: 96 %

## 2023-01-19 DIAGNOSIS — R53.1 WEAKNESS: ICD-10-CM

## 2023-01-19 LAB
ANION GAP SERPL CALC-SCNC: 8 MMOL/L — SIGNIFICANT CHANGE UP (ref 5–17)
BUN SERPL-MCNC: 8 MG/DL — SIGNIFICANT CHANGE UP (ref 7–23)
CALCIUM SERPL-MCNC: 9.3 MG/DL — SIGNIFICANT CHANGE UP (ref 8.4–10.5)
CHLORIDE SERPL-SCNC: 102 MMOL/L — SIGNIFICANT CHANGE UP (ref 96–108)
CO2 SERPL-SCNC: 27 MMOL/L — SIGNIFICANT CHANGE UP (ref 22–31)
CREAT SERPL-MCNC: 0.64 MG/DL — SIGNIFICANT CHANGE UP (ref 0.5–1.3)
EGFR: 101 ML/MIN/1.73M2 — SIGNIFICANT CHANGE UP
GLUCOSE SERPL-MCNC: 120 MG/DL — HIGH (ref 70–99)
HCT VFR BLD CALC: 38.9 % — LOW (ref 39–50)
HGB BLD-MCNC: 11.9 G/DL — LOW (ref 13–17)
MAGNESIUM SERPL-MCNC: 2.2 MG/DL — SIGNIFICANT CHANGE UP (ref 1.6–2.6)
MCHC RBC-ENTMCNC: 27.5 PG — SIGNIFICANT CHANGE UP (ref 27–34)
MCHC RBC-ENTMCNC: 30.6 GM/DL — LOW (ref 32–36)
MCV RBC AUTO: 90 FL — SIGNIFICANT CHANGE UP (ref 80–100)
NRBC # BLD: 0 /100 WBCS — SIGNIFICANT CHANGE UP (ref 0–0)
PHOSPHATE SERPL-MCNC: 2.8 MG/DL — SIGNIFICANT CHANGE UP (ref 2.5–4.5)
PLATELET # BLD AUTO: 227 K/UL — SIGNIFICANT CHANGE UP (ref 150–400)
POTASSIUM SERPL-MCNC: 4.4 MMOL/L — SIGNIFICANT CHANGE UP (ref 3.5–5.3)
POTASSIUM SERPL-SCNC: 4.4 MMOL/L — SIGNIFICANT CHANGE UP (ref 3.5–5.3)
RBC # BLD: 4.32 M/UL — SIGNIFICANT CHANGE UP (ref 4.2–5.8)
RBC # FLD: 14.3 % — SIGNIFICANT CHANGE UP (ref 10.3–14.5)
SODIUM SERPL-SCNC: 137 MMOL/L — SIGNIFICANT CHANGE UP (ref 135–145)
WBC # BLD: 8.46 K/UL — SIGNIFICANT CHANGE UP (ref 3.8–10.5)
WBC # FLD AUTO: 8.46 K/UL — SIGNIFICANT CHANGE UP (ref 3.8–10.5)

## 2023-01-19 PROCEDURE — 0225U NFCT DS DNA&RNA 21 SARSCOV2: CPT

## 2023-01-19 PROCEDURE — 86705 HEP B CORE ANTIBODY IGM: CPT

## 2023-01-19 PROCEDURE — 93975 VASCULAR STUDY: CPT

## 2023-01-19 PROCEDURE — 84100 ASSAY OF PHOSPHORUS: CPT

## 2023-01-19 PROCEDURE — 85027 COMPLETE CBC AUTOMATED: CPT

## 2023-01-19 PROCEDURE — 86706 HEP B SURFACE ANTIBODY: CPT

## 2023-01-19 PROCEDURE — 36415 COLL VENOUS BLD VENIPUNCTURE: CPT

## 2023-01-19 PROCEDURE — 85610 PROTHROMBIN TIME: CPT

## 2023-01-19 PROCEDURE — 70450 CT HEAD/BRAIN W/O DYE: CPT

## 2023-01-19 PROCEDURE — 81001 URINALYSIS AUTO W/SCOPE: CPT

## 2023-01-19 PROCEDURE — 96375 TX/PRO/DX INJ NEW DRUG ADDON: CPT

## 2023-01-19 PROCEDURE — 87340 HEPATITIS B SURFACE AG IA: CPT

## 2023-01-19 PROCEDURE — 80053 COMPREHEN METABOLIC PANEL: CPT

## 2023-01-19 PROCEDURE — 96374 THER/PROPH/DIAG INJ IV PUSH: CPT

## 2023-01-19 PROCEDURE — 93971 EXTREMITY STUDY: CPT

## 2023-01-19 PROCEDURE — 99239 HOSP IP/OBS DSCHRG MGMT >30: CPT

## 2023-01-19 PROCEDURE — 97161 PT EVAL LOW COMPLEX 20 MIN: CPT

## 2023-01-19 PROCEDURE — 94640 AIRWAY INHALATION TREATMENT: CPT

## 2023-01-19 PROCEDURE — 87635 SARS-COV-2 COVID-19 AMP PRB: CPT

## 2023-01-19 PROCEDURE — 72141 MRI NECK SPINE W/O DYE: CPT

## 2023-01-19 PROCEDURE — 72146 MRI CHEST SPINE W/O DYE: CPT

## 2023-01-19 PROCEDURE — 83540 ASSAY OF IRON: CPT

## 2023-01-19 PROCEDURE — 72125 CT NECK SPINE W/O DYE: CPT

## 2023-01-19 PROCEDURE — 83690 ASSAY OF LIPASE: CPT

## 2023-01-19 PROCEDURE — 86704 HEP B CORE ANTIBODY TOTAL: CPT

## 2023-01-19 PROCEDURE — 87040 BLOOD CULTURE FOR BACTERIA: CPT

## 2023-01-19 PROCEDURE — 85025 COMPLETE CBC W/AUTO DIFF WBC: CPT

## 2023-01-19 PROCEDURE — 76700 US EXAM ABDOM COMPLETE: CPT

## 2023-01-19 PROCEDURE — 83550 IRON BINDING TEST: CPT

## 2023-01-19 PROCEDURE — 83735 ASSAY OF MAGNESIUM: CPT

## 2023-01-19 PROCEDURE — 85730 THROMBOPLASTIN TIME PARTIAL: CPT

## 2023-01-19 PROCEDURE — 83036 HEMOGLOBIN GLYCOSYLATED A1C: CPT

## 2023-01-19 PROCEDURE — 80048 BASIC METABOLIC PNL TOTAL CA: CPT

## 2023-01-19 PROCEDURE — 72148 MRI LUMBAR SPINE W/O DYE: CPT

## 2023-01-19 PROCEDURE — 82728 ASSAY OF FERRITIN: CPT

## 2023-01-19 PROCEDURE — 97166 OT EVAL MOD COMPLEX 45 MIN: CPT

## 2023-01-19 PROCEDURE — 80074 ACUTE HEPATITIS PANEL: CPT

## 2023-01-19 PROCEDURE — 99285 EMERGENCY DEPT VISIT HI MDM: CPT

## 2023-01-19 PROCEDURE — 82962 GLUCOSE BLOOD TEST: CPT

## 2023-01-19 PROCEDURE — 84484 ASSAY OF TROPONIN QUANT: CPT

## 2023-01-19 PROCEDURE — 71045 X-RAY EXAM CHEST 1 VIEW: CPT

## 2023-01-19 PROCEDURE — 83880 ASSAY OF NATRIURETIC PEPTIDE: CPT

## 2023-01-19 RX ORDER — SENNA PLUS 8.6 MG/1
2 TABLET ORAL
Qty: 0 | Refills: 0 | DISCHARGE
Start: 2023-01-19

## 2023-01-19 RX ORDER — CEPHALEXIN 500 MG
1 CAPSULE ORAL
Qty: 0 | Refills: 0 | DISCHARGE
Start: 2023-01-19 | End: 2023-01-25

## 2023-01-19 RX ORDER — POLYETHYLENE GLYCOL 3350 17 G/17G
17 POWDER, FOR SOLUTION ORAL
Qty: 0 | Refills: 0 | DISCHARGE
Start: 2023-01-19

## 2023-01-19 RX ORDER — FUROSEMIDE 40 MG
40 TABLET ORAL EVERY 24 HOURS
Refills: 0 | Status: DISCONTINUED | OUTPATIENT
Start: 2023-01-19 | End: 2023-01-19

## 2023-01-19 RX ORDER — MIDODRINE HYDROCHLORIDE 2.5 MG/1
2 TABLET ORAL
Qty: 0 | Refills: 0 | DISCHARGE

## 2023-01-19 RX ORDER — CEPHALEXIN 500 MG
500 CAPSULE ORAL
Refills: 0 | Status: DISCONTINUED | OUTPATIENT
Start: 2023-01-19 | End: 2023-01-19

## 2023-01-19 RX ADMIN — Medication 100 MILLIGRAM(S): at 05:57

## 2023-01-19 RX ADMIN — GABAPENTIN 100 MILLIGRAM(S): 400 CAPSULE ORAL at 08:51

## 2023-01-19 RX ADMIN — DORZOLAMIDE HYDROCHLORIDE TIMOLOL MALEATE 1 DROP(S): 20; 5 SOLUTION/ DROPS OPHTHALMIC at 05:57

## 2023-01-19 RX ADMIN — Medication 40 MILLIGRAM(S): at 10:29

## 2023-01-19 RX ADMIN — GABAPENTIN 100 MILLIGRAM(S): 400 CAPSULE ORAL at 01:28

## 2023-01-19 RX ADMIN — FINASTERIDE 5 MILLIGRAM(S): 5 TABLET, FILM COATED ORAL at 12:49

## 2023-01-19 RX ADMIN — CYCLOBENZAPRINE HYDROCHLORIDE 5 MILLIGRAM(S): 10 TABLET, FILM COATED ORAL at 08:51

## 2023-01-19 RX ADMIN — POLYETHYLENE GLYCOL 3350 17 GRAM(S): 17 POWDER, FOR SOLUTION ORAL at 12:49

## 2023-01-19 RX ADMIN — Medication 500 MILLIGRAM(S): at 12:49

## 2023-01-19 RX ADMIN — Medication 1 DROP(S): at 13:22

## 2023-01-19 RX ADMIN — Medication 1 DROP(S): at 05:58

## 2023-01-19 NOTE — DISCHARGE NOTE PROVIDER - NSDCCPCAREPLAN_GEN_ALL_CORE_FT
PRINCIPAL DISCHARGE DIAGNOSIS  Diagnosis: Generalized weakness  Assessment and Plan of Treatment: You came to the hospital with complaint of generalized weakness. MRI imaging of your spine was done that showed long-term changes and post-surgical changes related to your cervical spine surgery from a few years ago. The orthopedic spine team evaluated you and determined there was no need for surgical intervention at this time. They also agree that there is low suspicion for infection of the bone at this time. The physical therapy team evaluted you and determined that subacute rehabilitation center would be the best option for you to regain your strength.  - you will be discharged to subacute rehab.  - regain your strength by following the physical and occupational therapists' management there.  - follow up with your primary care provider in the next two weeks.      SECONDARY DISCHARGE DIAGNOSES  Diagnosis: Cellulitis  Assessment and Plan of Treatment: You came with pain, swelling and redness in the left leg that was clinically shown to be cellulitis. You have been treated with IV antibiotics that have improved the redness, swelling and pain.  - continue taking Keflex 500 mg by mouth three times per day for 6 more days (1/20-1/25)  - return to the hospital if you have a recurrence of infection or if the redness, swelling and pain do not continue to improve.     PRINCIPAL DISCHARGE DIAGNOSIS  Diagnosis: Generalized weakness  Assessment and Plan of Treatment: You came to the hospital with complaint of generalized weakness. MRI imaging of your spine was done that showed long-term changes and post-surgical changes related to your cervical spine surgery from a few years ago. The orthopedic spine team evaluated you and determined there was no need for surgical intervention at this time. They also agree that there is low suspicion for infection of the bone at this time. The physical therapy team evaluted you and determined that subacute rehabilitation center would be the best option for you to regain your strength.  - you will be discharged to subacute rehab.  - regain your strength by following the physical and occupational therapists' management there.  - follow up with your primary care provider in the next two weeks.      SECONDARY DISCHARGE DIAGNOSES  Diagnosis: Cellulitis  Assessment and Plan of Treatment: You came with pain, swelling and redness in the left leg that was clinically shown to be cellulitis. You have been treated with IV antibiotics that have improved the redness, swelling and pain.  - continue taking Keflex 500 mg by mouth three times per day for 6 more days (1/20-1/25)  - return to the hospital if you have a recurrence of infection or if the redness, swelling and pain do not continue to improve.    Diagnosis: HTN (hypertension)  Assessment and Plan of Treatment: You were found to have mild hypertension during this hospital stay. We restarted your lasix 40 mg by mouth daily and your blood pressure improved.  - continue taking lasix 40 mg by mouth daily.  - STOP TAKING MIDODRINE. This medication raises your blood pressure and you do not need it.     PRINCIPAL DISCHARGE DIAGNOSIS  Diagnosis: Muscular deconditioning  Assessment and Plan of Treatment: You came to the hospital with complaint of generalized weakness. MRI imaging of your spine was done that showed long-term changes and post-surgical changes related to your cervical spine surgery from a few years ago. The orthopedic spine team evaluated you and determined there was no need for surgical intervention at this time. They also agree that there is low suspicion for infection of the bone at this time. The physical therapy team evaluted you and determined that subacute rehabilitation center would be the best option for you to regain your strength.  - you will be discharged to subacute rehab.  - regain your strength by following the physical and occupational therapists' management there.  - follow up with your primary care provider in the next two weeks.      SECONDARY DISCHARGE DIAGNOSES  Diagnosis: Cellulitis  Assessment and Plan of Treatment: You came with pain, swelling and redness in the left leg that was clinically shown to be cellulitis. You have been treated with IV antibiotics that have improved the redness, swelling and pain.  - continue taking Keflex 500 mg by mouth three times per day for 6 more days (1/20-1/25)  - return to the hospital if you have a recurrence of infection or if the redness, swelling and pain do not continue to improve.    Diagnosis: HTN (hypertension)  Assessment and Plan of Treatment: You were found to have mild hypertension during this hospital stay. We restarted your lasix 40 mg by mouth daily and your blood pressure improved.  - continue taking lasix 40 mg by mouth daily.  - STOP TAKING MIDODRINE. This medication raises your blood pressure and you do not need it.    Diagnosis: Constipation  Assessment and Plan of Treatment: During your hospital stay, you were found to not have a bowel movement for four days. We supplemented your diet with miralax, senna, dulcolax, and a dose of lactulose. After administrating an enema, you ultimately had a bowel movement on 1/19.  - continue taking miralax and dulcolax in the morning and senna tabs at night.  - your subacute rehab facility may adjust your bowel regimen as necessary.

## 2023-01-19 NOTE — DISCHARGE NOTE NURSING/CASE MANAGEMENT/SOCIAL WORK - NSDCFUADDAPPT_GEN_ALL_CORE_FT
You have an appointment with Dr. Green at [facility]:  January 23, 2023 at 10 am  [phone] [place] [address]  PLEASE BRING YOUR DISCHARGE PAPERWORK WITH YOU TO THIS APPOINTMENT.

## 2023-01-19 NOTE — DISCHARGE NOTE PROVIDER - ATTENDING DISCHARGE PHYSICAL EXAMINATION:
72M with PMH of prior cervical spine injury s/p surgery and BPH presenting with lower extremity swelling, noted to have cellulitis of the left leg.     Physical exam  General: no acute distress, sitting up in bed  HEENT: normocephalic, atraumatic, MMM  Cards: RRR, normal S1/S2, no murmurs, rubs or gallops  Pulm: CTAB, no wheeze, crackles, rales or rhonchi  Ab: mildly distended, soft, normoactive bowel sounds, nontender, passing flatus  Ext: chronic lower extremity swelling, L>R, showing improvement since starting antibiotics  Neuro: chronic bilateral hand contractures, limited range of motion in both arms, no tenderness to palpation of spine  Skin: warm and dry, no obvious rashes or lesions    - keflex today for 6 more days to complete 10 days of treatment  - restart home dose of lasix, systolic blood pressures slightly elevated to 160s at times, asymptomatic  - lower extremity dopplers negative for DVT  - MRI spine with concern for focus of osteomyelitis - getting spine consult - no concern for osteo, only evidence of post-operative changes from surgery many years ago  - outpatient follow up with PCP and all specialities  - being discharged to [facility] today  - has chronic constipation, required an enema with successful bowel movement afterwards.  [facility] can decide on further bowel regimen.

## 2023-01-19 NOTE — PROGRESS NOTE ADULT - SUBJECTIVE AND OBJECTIVE BOX
Physical Medicine and Rehabilitation Progress Note :       Patient is a 72y old  Male who presents with a chief complaint of Weakness, LLE cellulitis (19 Jan 2023 06:17)      HPI:  72 year old male with PMH of BPH, cervical spine injury in 2018 presented to [facility] with his wife on 1/15 with complaints of progressively worsening generalized weakness and bilateral lower extremity swelling (worse on the left). He had a fall in 2018 and required cervical spine surgery. After the surgery, he reports he had worsening weakness, especially in his upper extremities. He had difficulty getting physical therapy after the pandemic and his hands became contracted during that time. His overall mobility and functional capacity has progressively declined since the surgery. He reports some dyspnea with exertion and a productive cough which has been going on for months. He is a former smoker, but never diagnosed with COPD. He has had worsening bilateral lower extremity edema in recent months, but his left lower extremity below the knee has become more swollen, warm, and pink in the past four days. It's not severely painful and he hasn't taken anything for it. No recent trauma to the area.     He denies nausea/vomiting/diarrhea, fever, dysuria.     Note patient does not know all of his medical history - he described what sounded like PVD and a visit to a vascular surgeon for an angioplasty in the past but is unclear on details. His medication list is updated as below. He is not prescribed any statin, anti-platelet, or anticoagulant.    ED COURSE:   Initial vitals: Temp 97.8F, HR 94, /72, RR 16, O2 98% RA   Labs significant for WBC 15.04, hgb 11.5, hct 37.1, plt 145, %neuts 77.8%, glucose 154, alb 3.1, AST 98, lipase negative, trops negative   UA negative   CT head negative for acute pathology   CT cervical spine negative   LLE dopplers negative for DVT   CXR no infiltrates, No consolidation, elevated left hemidiaphragm with atalectasis at base.  EKG NSR, rate 88, no ST-T changes, QTc 416  Medications: ceftriaxone 1g IV x1, vancomycin 1g IV x1  Consults: none    (16 Jan 2023 10:25)                            11.9   8.46  )-----------( 227      ( 19 Jan 2023 08:13 )             38.9       01-19    137  |  102  |  8   ----------------------------<  120<H>  4.4   |  27  |  0.64    Ca    9.3      19 Jan 2023 08:13  Phos  2.8     01-19  Mg     2.2     01-19    TPro  7.5  /  Alb  3.3  /  TBili  0.5  /  DBili  x   /  AST  66<H>  /  ALT  30  /  AlkPhos  76  01-18    Vital Signs Last 24 Hrs  T(C): 36.5 (19 Jan 2023 09:59), Max: 36.6 (18 Jan 2023 15:22)  T(F): 97.7 (19 Jan 2023 09:59), Max: 97.9 (18 Jan 2023 15:22)  HR: 84 (19 Jan 2023 09:59) (84 - 103)  BP: 135/88 (19 Jan 2023 09:59) (135/88 - 170/89)  BP(mean): --  RR: 19 (19 Jan 2023 09:59) (17 - 19)  SpO2: 96% (19 Jan 2023 09:59) (95% - 97%)    Parameters below as of 19 Jan 2023 09:59  Patient On (Oxygen Delivery Method): room air        MEDICATIONS  (STANDING):  artificial  tears Solution 1 Drop(s) Both EYES three times a day  cephalexin 500 milliGRAM(s) Oral four times a day  cyclobenzaprine 10 milliGRAM(s) Oral at bedtime  cyclobenzaprine 5 milliGRAM(s) Oral every 24 hours  dorzolamide 2%/timolol 0.5% Ophthalmic Solution 1 Drop(s) Both EYES two times a day  enoxaparin Injectable 40 milliGRAM(s) SubCutaneous every 24 hours  finasteride 5 milliGRAM(s) Oral daily  furosemide    Tablet 40 milliGRAM(s) Oral every 24 hours  gabapentin 100 milliGRAM(s) Oral every 8 hours  latanoprost 0.005% Ophthalmic Solution 1 Drop(s) Right EYE every 24 hours  melatonin 3 milliGRAM(s) Oral at bedtime  polyethylene glycol 3350 17 Gram(s) Oral daily  prednisoLONE acetate 1% Suspension 1 Drop(s) Left EYE every 24 hours  senna 2 Tablet(s) Oral at bedtime  tamsulosin 0.8 milliGRAM(s) Oral at bedtime    MEDICATIONS  (PRN):  acetaminophen     Tablet .. 650 milliGRAM(s) Oral every 6 hours PRN Temp greater or equal to 38C (100.4F), Mild Pain (1 - 3)  guaiFENesin Oral Liquid (Sugar-Free) 100 milliGRAM(s) Oral every 6 hours PRN Cough         Initial Physical Therapy Functional Status Assessment :     Previous Level of Function:     · Ambulation Skills	needs device and assist  · Transfer Skills	needs device and assist  · ADL Skills	needs device and assist  · Work/Leisure Activity	needs device and assist  · Additional Comments	Pt lives with wife in apt with no VELMA. Pt reports at baseline he requires assist for all ADLs and uses a wheelchair for all mobility. Pt able to perform transfers with RW and 1 person assist (wife or HHA0. Pt has HHA 5 days a week, unsure hours    Cognitive Status Examination:   · Orientation	oriented to person, place, time and situation  · Level of Consciousness	alert  · Follows Commands and Answers Questions	100% of the time  · Personal Safety and Judgment	intact    Range of Motion Exam:   · Range of Motion Examination	bilateral lower extremity ROM was WFL (within functional limits)    Manual Muscle Testing:   · Manual Muscle Testing Results	grossly assessed due to  at least 3/5 BL UE & LE based on antigravity mobility.    MMT Knee:     · Knee Flexion	(4-) good minus, left  (4-) good minus, right  · Knee Extension	(4) good, left  (4) good, right    Bed Mobility: Rolling/Turning:     · Level of [place]	moderate assist (50% patients effort); maximum assist (25% patients effort)  · Physical Assist/Nonphysical Assist	1 person assist  · Assistive Device	bed rails    Bed Mobility: Scooting/Bridging:     · Level of [place]	maximum assist (25% patients effort)  · Physical Assist/Nonphysical Assist	1 person assist  · Assistive Device	bed rails    Bed Mobility: Sit to Supine:     · Level of [place]	moderate assist (50% patients effort); maximum assist (25% patients effort)  · Physical Assist/Nonphysical Assist	2 person assist    Bed Mobility: Supine to Sit:     · Level of [place]	maximum assist (25% patients effort)  · Physical Assist/Nonphysical Assist	1 person assist  · Assistive Device	bed rails    Bed Mobility Analysis:     · Bed Mobility Limitations	decreased ability to use legs for bridging/pushing; decreased ability to use arms for pushing/pulling; impaired ability to control trunk for mobility  · Impairments Contributing to Impaired Bed Mobility	decreased flexibility; impaired motor control; impaired postural control; decreased strength    Balance Skills Assessment:     · Sitting Balance: Static	fair balance  · Sitting Balance: Dynamic	fair balance  · Sit-to-Stand Balance	fair balance    Clinical Impressions:   · Criteria for Skilled Therapeutic Interventions	impairments found; rehab potential; anticipated equipment needs at discharge; functional limitations in following categories; therapy frequency; predicted duration of therapy intervention; anticipated discharge recommendation  · Impairments Found (describe specific impairments)	aerobic capacity/endurance; gait, locomotion, and balance; muscle strength; posture; ROM  · Functional Limitations in Following Categories (describe specific limitations)	self-care; home management; community/leisure          PM&R Impression : as above    Current Disposition Plan Recommendations :    subacute rehab placement

## 2023-01-19 NOTE — DISCHARGE NOTE NURSING/CASE MANAGEMENT/SOCIAL WORK - NSDCPEFALRISK_GEN_ALL_CORE
For information on Fall & Injury Prevention, visit: [email or web address] OR  [email or web address] OR  https://www.cdc.gov/steadi/patient.html

## 2023-01-19 NOTE — DISCHARGE NOTE PROVIDER - NSDCFUADDAPPT_GEN_ALL_CORE_FT
You have an appointment with Dr. Green at [facility]:  January 23, 2023 at 10 am  [phone] [place] [address]  PLEASE BRING YOUR DISCHARGE PAPERWORK WITH YOU TO THIS APPOINTMENT.

## 2023-01-19 NOTE — DISCHARGE NOTE PROVIDER - NSDCCPTREATMENT_GEN_ALL_CORE_FT
PRINCIPAL PROCEDURE  Procedure: MRI cervical spine  Findings and Treatment: Performed 1/17/2023  IMPRESSION:  1. A previous cervical laminectomies and posterior fusion C3 through C6   with hardware metal artifacts limiting the evaluation.  Cervical spine  degeneration, individual levels, see above.  2. A heterogeneous signal greatest C3 through C6 vertebral bodies, could be chronic postsurgical changes, nonspecific marrow replacement process, differential also includes infection, osteomyelitis or sequela of  osteomyelitis, recommend clinical correlation correlation with laboratory  values.  3. There is cord atrophy and central cord signal at C4-C5 levels.      SECONDARY PROCEDURE  Procedure: MRI of thoracic spine  Findings and Treatment: Performed 1/17/2023  FINDINGS:  Bones/joints: No acute fracture or malalignment. A well-defined lesions, the largest within the T3 vertebral body, may represent a benign hemangiomas, may correlate with nuclear medicine bone scan. No significant disc bulge or spinal canal narrowing. A heterogeneous signal of the visualized C6, C7, also signal of T1-T2 vertebral bodies anterior endplates, a differential includes not  limited to a superimposed infection, developing osteomyelitis or a   sequela of remote osteomyelitis, a progressing endplate changes, among other entities.  Spinal cord: Normal signal. No cord compression.  T1-T2: T1-T2 level, a moderate disc space narrowing, a disc osteophyte, a mild spinal canal narrowing, a moderate right foraminal narrowing.  T2-T12:  No significant disc bulge, spinal canal or foraminal narrowing.  Soft tissues:  Postsurgical changes in the visualized cervical soft   tissues.  IMPRESSION:  1. A well-defined lesions, the largest within the T3 vertebral body, may represent a benign hemangiomas, may correlate with nuclear medicine bone scan.  2. A heterogeneous signal of the visualized C6, C7, also signal of T1-T2 vertebral bodies anterior endplates, a differential includes not limited to a superimposed infection, developing osteomyelitis or a sequela of remote osteomyelitis, a progressing endplate changes, among other entities.      Procedure: MRI lumbar spine wo contrast  Findings and Treatment: Performed 1/17/2023  FINDINGS:  Anatomic lumbarization of S1. 0.6 cm grade 1 anterolisthesis of L5 on S1. Modic type 1 edematous degenerative endplate change at L5-S1. Lumbar vertebral body heights are maintained. No cord compression. No abnormal cord signal.   Conus medullaris terminates at the L1 level. Paravertebral soft tissues are unremarkable.  L1-L2: No significant canal or foraminal narrowing.  L2-L3: Right foraminal disc protrusion causing mild right foraminal   narrowing. No significant canal narrowing.  L3-L4: Broad-based disc bulge causes mild right and mild-to-moderate left foraminal narrowing. Mild canal narrowing.  L4-L5: Broad-based disc bulge and facet hypertrophy cause mild canal narrowing and mild-to-moderate bilateral foraminal narrowing.  L5-S1: Combination of anterolisthesis, anterior epidural lipomatosis,  broad-based disc bulge, and facet hypertrophy cause moderate canal narrowing with effacement of the bilateral lateral recesses. Moderate to severe bilateral foraminal narrowing with impingement upon the bilateral exiting L5 nerve roots.  IMPRESSION:  Multilevel spondylotic changes of the lumbar spine, most pronounced at L5-S1, as detailed above.

## 2023-01-19 NOTE — DISCHARGE NOTE PROVIDER - NSDCQMAMI_CARD_ALL_CORE
No You can access the FollowMyHealth Patient Portal offered by [facility] by registering at the following website: [email or web address]. By joining [facility]’s FollowMyHealth portal, you will also be able to view your health information using other applications (apps) compatible with our system.

## 2023-01-19 NOTE — DISCHARGE NOTE NURSING/CASE MANAGEMENT/SOCIAL WORK - PATIENT PORTAL LINK FT
You can access the FollowMyHealth Patient Portal offered by [facility] by registering at the following website: [email or web address]. By joining [facility]’s FollowMyHealth portal, you will also be able to view your health information using other applications (apps) compatible with our system.

## 2023-01-19 NOTE — DISCHARGE NOTE PROVIDER - PROVIDER TOKENS
FREE:[LAST:[[facility]],FIRST:[[facility]],PHONE:[phone]],FAX:[phone]    -],ADDRESS:[[phone] [place] [address]],SCHEDULEDAPPT:[01/23/2023],SCHEDULEDAPPTTIME:[10:00 AM]]

## 2023-01-19 NOTE — DISCHARGE NOTE PROVIDER - CARE PROVIDER_API CALL
[facility]  [phone] [place] [address]  Phone: [phone]  Fax: (   )    -  Scheduled Appointment: 01/23/2023 10:00 AM

## 2023-01-19 NOTE — DISCHARGE NOTE PROVIDER - HOSPITAL COURSE
#Discharge: do not delete    Patient is __ yo M/F with past medical history of _____ presented with _____, found to have _____ (one liner)    Hospital course (by problem):   72 year old male with PMH of BPH, cervical spine injury in 2018 presented with progressively worsening generalized weakness and b/l lower extremity swelling (L>R), found to have LLE cellulitis.     #Cellulitis.   ·  Plan: Pt with chronic b/l lower extremity edema for the past few years, but worsening on the left side for the past 4 days with associated erythema and warm to touch. No tenderness to palpation. No open areas. No MRSA risk factors. No fever. Admission WBC 15, now resolved.  LE dopplers negative.    Plan:  - Cefazolin 2g q8 (1/16 - )  - f/u blood cultures - no growth on culture to date.   - will switch to PO cephalexin on dc.     Problem/Plan - 2:  ·  Problem: Generalized weakness.   ·  Plan: Pt with worsening generalized weakness since cervical surgery in 2018 (patient does not know details of surgery). Worse in upper extremities. Hands with b/l contractures. His wife and HHA assist with ADLs.  PT/OT recommend [facility]  MRI shows post-surgical spine changes, cannot rule out osteomyelitis. Ortho spine consulted to comment.    Plan:  - Hold home tizanidine 2mg in AM and 4mg in PM and gabapentin 100mg TID since pt reports they don't help. Can restart as needed.  - Need collateral on surgery   - No need for imaging or neuro consult at this time given no acute, new changes  - given exercise ball to work out fingers.     Problem/Plan - 3:  ·  Problem: Lower extremity edema.   ·  Plan: Pt with chronic b/l lower extremity edema for the past few years. He reports he had a cardiac workup and he was told he doesn't have heart failure. . Reports he saw a vascular surgeon at one point and a procedure was done but he doesn't know what and wasn't given any diagnosis or medications.  Unknown etiology of LE edema. Possibly PVD or lymphedema.   Takes furosemide 40mg qd at home.    Plan:  -Hold home furosemide and consider restarting if needed.     Problem/Plan - 4:  ·  Problem: Cough.   ·  Plan: Pt with chronic cough and dyspnea on exertion for past 1 year. Mildly productive of sputum. Former smoker. CXR with possible LLL atelectasis. He reports he was never told her had asthma or COPD. Upper airway noise on exam but no wheeze in lower airway. Low concern for infection. RVP negative.  Suspecting undiagnosed COPD vs atelectasis from immobility.   Wheezing has since resolved, satting well on RA.    Plan:  -Trial of duonebs  -Guaifenesin PRN  -PFTs as outpatient   -Incentive spirometer.     Problem/Plan - 5:  ·  Problem: Glaucoma.   ·  Plan: Pt with h/o glaucoma and unknown eye surgery on left side.    -continue with dorzolamide+timolol eye drops BID   -Hold prednisolone and Vyzulta (pt unsure which one goes in which eye, will need more collateral)  - collateral from Dr. Laird, ophthalmologist.     Problem/Plan - 6:  ·  Problem: Constipation.   ·  Plan: Pt with distended abdomen and chronic constipation at baseline.  -Senna and miralax    #Transaminitis   AST> ALT  RUQ U/S with hepatomegaly. Hepatitis panel negative.     Problem/Plan - 7:  ·  Problem: BPH (benign prostatic hyperplasia).   ·  Plan: -c/w home finasteride 5mg daily and tamsulosin 0.8mg daily.     Problem/Plan - 8:  ·  Problem: Anemia.   ·  Plan: Hb 11 -12. Ferritin 419. Tsat 9.  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  Patient was discharged to: (home/[facility]/acute rehab/hospice, etc, and with what services – home health PT/RN? Home O2?)    New medications:   Changes to old medications:  Medications that were stopped:    Items to follow up as outpatient:    Physical exam at the time of discharge:  General: NAD, lying in bed comfortably   HEENT: Atraumatic, Normocephalic, EOMI, PERRLA, conjunctiva and sclera clear  Neck: Supple, No JVD  Cardiovascular: Regular rate and rhythm, no murmurs, rubs, or gallops  Respiratory: Clear to auscultation bilaterally, no rales, rhonchi, wheezing, or rubs. Unlabored respirations.   Gastrointestinal: Bowel sounds x4, tense and distended, no rebound tenderness,   Extremities: 2+ Peripheral Pulses, brisk cap refill, no clubbing, cyanosis, bilateral +1 lower extremity edema, L>R. LLE erythematous and warm to touch. Nontender.   Neurological:  A&Ox3, no focal deficits. Generalized weakness, no asymmetry. Hands with bilateral contractures     #Discharge: do not delete    72 year old male with PMH of BPH, cervical spine injury in 2018 presented with progressively worsening generalized weakness and b/l lower extremity swelling (L>R), found to have LLE cellulitis.     Hospital course (by problem):   #Cellulitis.   Pt with chronic b/l lower extremity edema for the past few years, but worsening on the left side for the past 4 days with associated erythema and warm to touch. No tenderness to palpation. No open areas. No MRSA risk factors. No fever. Admission WBC 15, now resolved.  LE dopplers negative.  - Cefazolin 500 mg IV--->po q8h (1/16 - 1/25)  - followed blood cultures - no growth on culture to date.   - transition to PO cephalexin on dc.    #Generalized weakness.   Pt with worsening generalized weakness since cervical surgery in 2018 (patient does not know details of surgery). Worse in upper extremities. Hands with b/l contractures. His wife and HHA assist with ADLs.  PT/OT recommend [facility]  MRI shows post-surgical spine changes, cannot rule out osteomyelitis. Ortho spine consulted to comment.  - Hold home tizanidine 2mg in AM and 4mg in PM and gabapentin 100mg TID since pt reports they don't help. Can restart as needed.  - Need collateral on surgery   - No need for imaging or neuro consult at this time given no acute, new changes  - given exercise ball to work out fingers.    #Lower extremity edema.   Pt with chronic b/l lower extremity edema for the past few years. He reports he had a cardiac workup and he was told he doesn't have heart failure. . Reports he saw a vascular surgeon at one point and a procedure was done but he doesn't know what and wasn't given any diagnosis or medications.  Unknown etiology of LE edema. Possibly PVD or lymphedema.   Takes furosemide 40mg qd at home.  - c/w furosemide 40 mg po qd    #Cough.   Pt with chronic cough and dyspnea on exertion for past 1 year. Mildly productive of sputum. Former smoker. CXR with possible LLL atelectasis. He reports he was never told her had asthma or COPD. Upper airway noise on exam but no wheeze in lower airway. Low concern for infection. RVP negative.  Suspecting undiagnosed COPD vs atelectasis from immobility.   Wheezing has since resolved, satting well on RA.  -Trial of duonebs  -Guaifenesin PRN  -PFTs as outpatient   -Incentive spirometer.    #Glaucoma.   Pt with h/o glaucoma and unknown eye surgery on left side.  -continue with dorzolamide+timolol eye drops BID   -Hold prednisolone and Vyzulta (pt unsure which one goes in which eye, will need more collateral)  -f/u outpatient with Dr. Laird, ophthalmologist.    #Constipation.   Pt with distended abdomen and chronic constipation at baseline.  -Senna and miralax  -s/p lactulose 10 g po x1    #Transaminitis   AST> ALT  RUQ U/S with hepatomegaly. Hepatitis panel negative.    #BPH (benign prostatic hyperplasia).   -c/w home finasteride 5mg daily and tamsulosin 0.8mg daily.    #Anemia.   Hb 11 -12. Ferritin 419. Tsat 9.  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    Patient was discharged to: [facility]    New medications: Keflex 500 mg po q8h x6 days  Changes to old medications:  Medications that were stopped:    Items to follow up as outpatient: f/u with Dr. Green PCP on 1/23/2023 at 10am    Physical exam at the time of discharge:  General: NAD, lying in bed comfortably   HEENT: Atraumatic, Normocephalic, EOMI, PERRLA, conjunctiva and sclera clear  Neck: Supple, No JVD  Cardiovascular: Regular rate and rhythm, no murmurs, rubs, or gallops  Respiratory: Clear to auscultation bilaterally, no rales, rhonchi, wheezing, or rubs. Unlabored respirations.   Gastrointestinal: Bowel sounds x4, tense and distended, no rebound tenderness,   Extremities: 2+ Peripheral Pulses, brisk cap refill, no clubbing, cyanosis, bilateral +1 lower extremity edema, L>R. LLE erythematous, improved from yesterday. Nontender.   Neurological:  A&Ox3, no focal deficits. Generalized weakness, no asymmetry. Hands with bilateral contractures     #Discharge: do not delete    72 year old male with PMH of BPH, cervical spine injury in 2018 presented with progressively worsening generalized weakness and b/l lower extremity swelling (L>R), found to have LLE cellulitis.     Hospital course (by problem):   #Cellulitis.   Pt with chronic b/l lower extremity edema for the past few years, but worsening on the left side for the past 4 days with associated erythema and warm to touch. No tenderness to palpation. No open areas. No MRSA risk factors. No fever. Admission WBC 15, now resolved.  LE dopplers negative.  - Cefazolin 500 mg IV--->po q8h (1/16 - 1/25)  - followed blood cultures - no growth on culture to date.   - transition to PO cephalexin on dc.    #Muscular deconditioning.   Pt with worsening generalized weakness since cervical surgery in 2018 (patient does not know details of surgery). Worse in upper extremities. Hands with b/l contractures. His wife and HHA assist with ADLs.  PT/OT recommend [facility]  MRI shows post-surgical spine changes, cannot rule out osteomyelitis. Ortho spine consulted to comment.  - Hold home tizanidine 2mg in AM and 4mg in PM and gabapentin 100mg TID since pt reports they don't help. Can restart as needed.  - Need collateral on surgery   - No need for imaging or neuro consult at this time given no acute, new changes  - given exercise ball to work out fingers.    #Lower extremity edema.   Pt with chronic b/l lower extremity edema for the past few years. He reports he had a cardiac workup and he was told he doesn't have heart failure. . Reports he saw a vascular surgeon at one point and a procedure was done but he doesn't know what and wasn't given any diagnosis or medications.  Unknown etiology of LE edema. Possibly PVD or lymphedema.   Takes furosemide 40mg qd at home.  - c/w furosemide 40 mg po qd    #Cough.   Pt with chronic cough and dyspnea on exertion for past 1 year. Mildly productive of sputum. Former smoker. CXR with possible LLL atelectasis. He reports he was never told her had asthma or COPD. Upper airway noise on exam but no wheeze in lower airway. Low concern for infection. RVP negative.  Suspecting undiagnosed COPD vs atelectasis from immobility.   Wheezing has since resolved, satting well on RA.  -Trial of duonebs  -Guaifenesin PRN  -PFTs as outpatient   -Incentive spirometer.    #Glaucoma.   Pt with h/o glaucoma and unknown eye surgery on left side.  -continue with dorzolamide+timolol eye drops BID   -Hold prednisolone and Vyzulta (pt unsure which one goes in which eye, will need more collateral)  -f/u outpatient with Dr. Laird, ophthalmologist.    #Constipation.   Pt with distended abdomen and chronic constipation at baseline. Bowel movement on 1/19 after not having one since 1/15.  -Senna, miralax, dulcolax  -s/p lactulose 10 g po x1  -continue bowel regimen at [facility]    #Transaminitis   AST> ALT  RUQ U/S with hepatomegaly. Hepatitis panel negative.    #BPH (benign prostatic hyperplasia).   -c/w home finasteride 5mg daily and tamsulosin 0.8mg daily.    #Anemia.   Hb 11 -12. Ferritin 419. Tsat 9.  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    Patient was discharged to: [facility]    New medications: Keflex 500 mg po q8h x6 days  Changes to old medications:  Medications that were stopped:    Items to follow up as outpatient: f/u with Dr. Green PCP on 1/23/2023 at 10am    Physical exam at the time of discharge:  General: NAD, lying in bed comfortably   HEENT: Atraumatic, Normocephalic, EOMI, PERRLA, conjunctiva and sclera clear  Neck: Supple, No JVD  Cardiovascular: Regular rate and rhythm, no murmurs, rubs, or gallops  Respiratory: Clear to auscultation bilaterally, no rales, rhonchi, wheezing, or rubs. Unlabored respirations.   Gastrointestinal: Bowel sounds x4, tense and distended, no rebound tenderness,   Extremities: 2+ Peripheral Pulses, brisk cap refill, no clubbing, cyanosis, bilateral +1 lower extremity edema, L>R. LLE erythematous, improved from yesterday. Nontender.   Neurological:  A&Ox3, no focal deficits. Generalized weakness, no asymmetry. Hands with bilateral contractures

## 2023-01-19 NOTE — PROGRESS NOTE ADULT - ASSESSMENT
per Internal Medicine    72 year old male with PMH of BPH, cervical spine injury in 2018 presented with progressively worsening generalized weakness and b/l lower extremity swelling (L>R), found to have LLE cellulitis.     Problem/Plan - 1:  ·  Problem: Cellulitis.   ·  Plan: Pt with chronic b/l lower extremity edema for the past few years, but worsening on the left side for the past 4 days with associated erythema and warm to touch. No tenderness to palpation. No open areas. No MRSA risk factors. No fever. Admission WBC 15, now resolved.  LE dopplers negative.    Plan:  - Cefazolin 2g q8 (1/16 - )  - f/u blood cultures - no growth on culture to date.   - will switch to PO cephalexin on dc.    Problem/Plan - 2:  ·  Problem: Generalized weakness.   ·  Plan: Pt with worsening generalized weakness since cervical surgery in 2018 (patient does not know details of surgery). Worse in upper extremities. Hands with b/l contractures. His wife and HHA assist with ADLs.  PT/OT recommend [facility]  MRI shows post-surgical spine changes, cannot rule out osteomyelitis. Ortho spine consulted to comment.    Plan:  - Hold home tizanidine 2mg in AM and 4mg in PM and gabapentin 100mg TID since pt reports they don't help. Can restart as needed.  - Need collateral on surgery   - No need for imaging or neuro consult at this time given no acute, new changes  - given exercise ball to work out fingers.    Problem/Plan - 3:  ·  Problem: Lower extremity edema.   ·  Plan: Pt with chronic b/l lower extremity edema for the past few years. He reports he had a cardiac workup and he was told he doesn't have heart failure. . Reports he saw a vascular surgeon at one point and a procedure was done but he doesn't know what and wasn't given any diagnosis or medications.  Unknown etiology of LE edema. Possibly PVD or lymphedema.   Takes furosemide 40mg qd at home.    Plan:  -Hold home furosemide and consider restarting if needed.    Problem/Plan - 4:  ·  Problem: Cough.   ·  Plan: Pt with chronic cough and dyspnea on exertion for past 1 year. Mildly productive of sputum. Former smoker. CXR with possible LLL atelectasis. He reports he was never told her had asthma or COPD. Upper airway noise on exam but no wheeze in lower airway. Low concern for infection. RVP negative.  Suspecting undiagnosed COPD vs atelectasis from immobility.   Wheezing has since resolved, satting well on RA.    Plan:  -Trial of duonebs  -Guaifenesin PRN  -PFTs as outpatient   -Incentive spirometer.    Problem/Plan - 5:  ·  Problem: Glaucoma.   ·  Plan: Pt with h/o glaucoma and unknown eye surgery on left side.    -continue with dorzolamide+timolol eye drops BID   -Hold prednisolone and Vyzulta (pt unsure which one goes in which eye, will need more collateral)  - collateral from Dr. Laird, ophthalmologist.    Problem/Plan - 6:  ·  Problem: Constipation.   ·  Plan: Pt with distended abdomen and chronic constipation at baseline.  -Senna and miralax    #Transaminitis   AST> ALT  RUQ U/S with hepatomegaly. Hepatitis panel negative.    Problem/Plan - 7:  ·  Problem: BPH (benign prostatic hyperplasia).   ·  Plan: -c/w home finasteride 5mg daily and tamsulosin 0.8mg daily.    Problem/Plan - 8:  ·  Problem: Anemia.   ·  Plan: Hb 11 -12. Ferritin 419. Tsat 9.  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    Problem/Plan - 9:  ·  Problem: Prophylactic measure.   ·  Plan: F: None  E: Replenish PRN  N: Regular  DVT: Lovenox  Code: FULL  Dispo: [facility] pending [facility].

## 2023-01-19 NOTE — DISCHARGE NOTE PROVIDER - NSDCMRMEDTOKEN_GEN_ALL_CORE_FT
dorzolamide-timolol 2%-0.5% ophthalmic solution: 1 drop(s) to each affected eye 2 times a day  finasteride 5 mg oral tablet: 1 tab(s) orally once a day  furosemide 40 mg oral tablet: 1 tab(s) orally once a day  gabapentin 100 mg oral capsule: 1 cap(s) orally 3 times a day  Melatonin 5 mg oral tablet: 1 tab(s) orally once a day (at bedtime)  midodrine 5 mg oral tablet: 2 tab(s) orally 2 times a day  prednisoLONE acetate 0.12% ophthalmic suspension: 1 drop(s) to each affected eye 2 times a day (left eye)  tamsulosin 0.4 mg oral capsule: 2 cap(s) orally once a day  tiZANidine 2 mg oral tablet: 2 tab(s) orally qHS. 1 tab in the morning.  Vyzulta 0.024% ophthalmic solution: 1 drop(s) to each affected eye once a day (in the evening) to right eye   cephalexin 500 mg oral capsule: 1 cap(s) orally 4 times a day, final doses 1/25/2023  dorzolamide-timolol 2%-0.5% ophthalmic solution: 1 drop(s) to each affected eye 2 times a day  finasteride 5 mg oral tablet: 1 tab(s) orally once a day  furosemide 40 mg oral tablet: 1 tab(s) orally once a day  gabapentin 100 mg oral capsule: 1 cap(s) orally 3 times a day  Melatonin 5 mg oral tablet: 1 tab(s) orally once a day (at bedtime)  midodrine 5 mg oral tablet: 2 tab(s) orally 2 times a day  prednisoLONE acetate 0.12% ophthalmic suspension: 1 drop(s) to each affected eye 2 times a day (left eye)  tamsulosin 0.4 mg oral capsule: 2 cap(s) orally once a day  tiZANidine 2 mg oral tablet: 2 tab(s) orally qHS. 1 tab in the morning.  Vyzulta 0.024% ophthalmic solution: 1 drop(s) to each affected eye once a day (in the evening) to right eye   cephalexin 500 mg oral capsule: 1 cap(s) orally 4 times a day, final doses 1/25/2023  dorzolamide-timolol 2%-0.5% ophthalmic solution: 1 drop(s) to each affected eye 2 times a day  finasteride 5 mg oral tablet: 1 tab(s) orally once a day  furosemide 40 mg oral tablet: 1 tab(s) orally once a day  gabapentin 100 mg oral capsule: 1 cap(s) orally 3 times a day  Melatonin 5 mg oral tablet: 1 tab(s) orally once a day (at bedtime)  prednisoLONE acetate 0.12% ophthalmic suspension: 1 drop(s) to each affected eye 2 times a day (left eye)  tamsulosin 0.4 mg oral capsule: 2 cap(s) orally once a day  tiZANidine 2 mg oral tablet: 2 tab(s) orally qHS. 1 tab in the morning.  Vyzulta 0.024% ophthalmic solution: 1 drop(s) to each affected eye once a day (in the evening) to right eye   cephalexin 500 mg oral capsule: 1 cap(s) orally 4 times a day, final doses 1/25/2023  dorzolamide-timolol 2%-0.5% ophthalmic solution: 1 drop(s) to each affected eye 2 times a day  finasteride 5 mg oral tablet: 1 tab(s) orally once a day  furosemide 40 mg oral tablet: 1 tab(s) orally once a day  gabapentin 100 mg oral capsule: 1 cap(s) orally 3 times a day  Melatonin 5 mg oral tablet: 1 tab(s) orally once a day (at bedtime)  polyethylene glycol 3350 oral powder for reconstitution: 17 gram(s) orally once a day  prednisoLONE acetate 0.12% ophthalmic suspension: 1 drop(s) to each affected eye 2 times a day (left eye)  senna leaf extract oral tablet: 2 tab(s) orally once a day (at bedtime)  tamsulosin 0.4 mg oral capsule: 2 cap(s) orally once a day  tiZANidine 2 mg oral tablet: 2 tab(s) orally qHS. 1 tab in the morning.  Vyzulta 0.024% ophthalmic solution: 1 drop(s) to each affected eye once a day (in the evening) to right eye

## 2023-01-21 LAB
CULTURE RESULTS: SIGNIFICANT CHANGE UP
CULTURE RESULTS: SIGNIFICANT CHANGE UP
SPECIMEN SOURCE: SIGNIFICANT CHANGE UP
SPECIMEN SOURCE: SIGNIFICANT CHANGE UP

## 2023-03-13 NOTE — PATIENT PROFILE ADULT - NSPROGENOTHERPROVIDER_GEN_A_NUR
LABS:                          15.6   12.70 )-----------( 403      ( 13 Mar 2023 10:22 )             48.5     03-13    141  |  102  |  13  ----------------------------<  169<H>  4.6   |  29  |  0.9    Ca    10.0      13 Mar 2023 10:22  Mg     1.8     03-13    TPro  7.2  /  Alb  4.1  /  TBili  0.4  /  DBili  x   /  AST  13  /  ALT  17  /  AlkPhos  131<H>  03-13 home care

## 2023-12-16 ENCOUNTER — EMERGENCY (EMERGENCY)
Facility: HOSPITAL | Age: 73
LOS: 1 days | Discharge: HOME | End: 2023-12-16
Admitting: EMERGENCY MEDICINE
Payer: MEDICARE

## 2023-12-16 VITALS
HEIGHT: 70 IN | WEIGHT: 195.11 LBS | HEART RATE: 55 BPM | RESPIRATION RATE: 15 BRPM | OXYGEN SATURATION: 99 % | TEMPERATURE: 97 F | SYSTOLIC BLOOD PRESSURE: 106 MMHG | DIASTOLIC BLOOD PRESSURE: 70 MMHG

## 2023-12-16 VITALS
HEART RATE: 71 BPM | OXYGEN SATURATION: 97 % | DIASTOLIC BLOOD PRESSURE: 76 MMHG | SYSTOLIC BLOOD PRESSURE: 123 MMHG | RESPIRATION RATE: 18 BRPM | TEMPERATURE: 98 F

## 2023-12-16 PROBLEM — H40.9 UNSPECIFIED GLAUCOMA: Chronic | Status: ACTIVE | Noted: 2023-01-16

## 2023-12-16 PROBLEM — N40.0 BENIGN PROSTATIC HYPERPLASIA WITHOUT LOWER URINARY TRACT SYMPTOMS: Chronic | Status: ACTIVE | Noted: 2023-01-16

## 2023-12-16 LAB
ALBUMIN SERPL ELPH-MCNC: 3.3 G/DL — LOW (ref 3.4–5)
ALBUMIN SERPL ELPH-MCNC: 3.3 G/DL — LOW (ref 3.4–5)
ALP SERPL-CCNC: 92 U/L — SIGNIFICANT CHANGE UP (ref 40–120)
ALP SERPL-CCNC: 92 U/L — SIGNIFICANT CHANGE UP (ref 40–120)
ALT FLD-CCNC: 23 U/L — SIGNIFICANT CHANGE UP (ref 12–42)
ALT FLD-CCNC: 23 U/L — SIGNIFICANT CHANGE UP (ref 12–42)
ANION GAP SERPL CALC-SCNC: 4 MMOL/L — LOW (ref 9–16)
ANION GAP SERPL CALC-SCNC: 4 MMOL/L — LOW (ref 9–16)
APPEARANCE UR: CLEAR — SIGNIFICANT CHANGE UP
APPEARANCE UR: CLEAR — SIGNIFICANT CHANGE UP
AST SERPL-CCNC: 21 U/L — SIGNIFICANT CHANGE UP (ref 15–37)
AST SERPL-CCNC: 21 U/L — SIGNIFICANT CHANGE UP (ref 15–37)
BASOPHILS # BLD AUTO: 0.04 K/UL — SIGNIFICANT CHANGE UP (ref 0–0.2)
BASOPHILS # BLD AUTO: 0.04 K/UL — SIGNIFICANT CHANGE UP (ref 0–0.2)
BASOPHILS NFR BLD AUTO: 0.5 % — SIGNIFICANT CHANGE UP (ref 0–2)
BASOPHILS NFR BLD AUTO: 0.5 % — SIGNIFICANT CHANGE UP (ref 0–2)
BILIRUB SERPL-MCNC: 0.4 MG/DL — SIGNIFICANT CHANGE UP (ref 0.2–1.2)
BILIRUB SERPL-MCNC: 0.4 MG/DL — SIGNIFICANT CHANGE UP (ref 0.2–1.2)
BILIRUB UR-MCNC: NEGATIVE — SIGNIFICANT CHANGE UP
BILIRUB UR-MCNC: NEGATIVE — SIGNIFICANT CHANGE UP
BUN SERPL-MCNC: 15 MG/DL — SIGNIFICANT CHANGE UP (ref 7–23)
BUN SERPL-MCNC: 15 MG/DL — SIGNIFICANT CHANGE UP (ref 7–23)
CALCIUM SERPL-MCNC: 8.5 MG/DL — SIGNIFICANT CHANGE UP (ref 8.5–10.5)
CALCIUM SERPL-MCNC: 8.5 MG/DL — SIGNIFICANT CHANGE UP (ref 8.5–10.5)
CHLORIDE SERPL-SCNC: 104 MMOL/L — SIGNIFICANT CHANGE UP (ref 96–108)
CHLORIDE SERPL-SCNC: 104 MMOL/L — SIGNIFICANT CHANGE UP (ref 96–108)
CO2 SERPL-SCNC: 26 MMOL/L — SIGNIFICANT CHANGE UP (ref 22–31)
CO2 SERPL-SCNC: 26 MMOL/L — SIGNIFICANT CHANGE UP (ref 22–31)
COLOR SPEC: YELLOW — SIGNIFICANT CHANGE UP
COLOR SPEC: YELLOW — SIGNIFICANT CHANGE UP
CREAT SERPL-MCNC: 0.98 MG/DL — SIGNIFICANT CHANGE UP (ref 0.5–1.3)
CREAT SERPL-MCNC: 0.98 MG/DL — SIGNIFICANT CHANGE UP (ref 0.5–1.3)
DIFF PNL FLD: NEGATIVE — SIGNIFICANT CHANGE UP
DIFF PNL FLD: NEGATIVE — SIGNIFICANT CHANGE UP
EGFR: 81 ML/MIN/1.73M2 — SIGNIFICANT CHANGE UP
EGFR: 81 ML/MIN/1.73M2 — SIGNIFICANT CHANGE UP
EOSINOPHIL # BLD AUTO: 0.56 K/UL — HIGH (ref 0–0.5)
EOSINOPHIL # BLD AUTO: 0.56 K/UL — HIGH (ref 0–0.5)
EOSINOPHIL NFR BLD AUTO: 6.8 % — HIGH (ref 0–6)
EOSINOPHIL NFR BLD AUTO: 6.8 % — HIGH (ref 0–6)
FLUAV AG NPH QL: SIGNIFICANT CHANGE UP
FLUAV AG NPH QL: SIGNIFICANT CHANGE UP
FLUBV AG NPH QL: SIGNIFICANT CHANGE UP
FLUBV AG NPH QL: SIGNIFICANT CHANGE UP
GLUCOSE BLDC GLUCOMTR-MCNC: 100 MG/DL — HIGH (ref 70–99)
GLUCOSE BLDC GLUCOMTR-MCNC: 100 MG/DL — HIGH (ref 70–99)
GLUCOSE SERPL-MCNC: 106 MG/DL — HIGH (ref 70–99)
GLUCOSE SERPL-MCNC: 106 MG/DL — HIGH (ref 70–99)
GLUCOSE UR QL: NEGATIVE MG/DL — SIGNIFICANT CHANGE UP
GLUCOSE UR QL: NEGATIVE MG/DL — SIGNIFICANT CHANGE UP
HCT VFR BLD CALC: 37.2 % — LOW (ref 39–50)
HCT VFR BLD CALC: 37.2 % — LOW (ref 39–50)
HGB BLD-MCNC: 11.5 G/DL — LOW (ref 13–17)
HGB BLD-MCNC: 11.5 G/DL — LOW (ref 13–17)
IMM GRANULOCYTES NFR BLD AUTO: 0.4 % — SIGNIFICANT CHANGE UP (ref 0–0.9)
IMM GRANULOCYTES NFR BLD AUTO: 0.4 % — SIGNIFICANT CHANGE UP (ref 0–0.9)
KETONES UR-MCNC: NEGATIVE MG/DL — SIGNIFICANT CHANGE UP
KETONES UR-MCNC: NEGATIVE MG/DL — SIGNIFICANT CHANGE UP
LACTATE BLDV-MCNC: 1.2 MMOL/L — SIGNIFICANT CHANGE UP (ref 0.5–2)
LACTATE BLDV-MCNC: 1.2 MMOL/L — SIGNIFICANT CHANGE UP (ref 0.5–2)
LEUKOCYTE ESTERASE UR-ACNC: ABNORMAL
LEUKOCYTE ESTERASE UR-ACNC: ABNORMAL
LYMPHOCYTES # BLD AUTO: 2.78 K/UL — SIGNIFICANT CHANGE UP (ref 1–3.3)
LYMPHOCYTES # BLD AUTO: 2.78 K/UL — SIGNIFICANT CHANGE UP (ref 1–3.3)
LYMPHOCYTES # BLD AUTO: 33.8 % — SIGNIFICANT CHANGE UP (ref 13–44)
LYMPHOCYTES # BLD AUTO: 33.8 % — SIGNIFICANT CHANGE UP (ref 13–44)
MAGNESIUM SERPL-MCNC: 1.9 MG/DL — SIGNIFICANT CHANGE UP (ref 1.6–2.6)
MAGNESIUM SERPL-MCNC: 1.9 MG/DL — SIGNIFICANT CHANGE UP (ref 1.6–2.6)
MCHC RBC-ENTMCNC: 28.1 PG — SIGNIFICANT CHANGE UP (ref 27–34)
MCHC RBC-ENTMCNC: 28.1 PG — SIGNIFICANT CHANGE UP (ref 27–34)
MCHC RBC-ENTMCNC: 30.9 GM/DL — LOW (ref 32–36)
MCHC RBC-ENTMCNC: 30.9 GM/DL — LOW (ref 32–36)
MCV RBC AUTO: 91 FL — SIGNIFICANT CHANGE UP (ref 80–100)
MCV RBC AUTO: 91 FL — SIGNIFICANT CHANGE UP (ref 80–100)
MONOCYTES # BLD AUTO: 0.77 K/UL — SIGNIFICANT CHANGE UP (ref 0–0.9)
MONOCYTES # BLD AUTO: 0.77 K/UL — SIGNIFICANT CHANGE UP (ref 0–0.9)
MONOCYTES NFR BLD AUTO: 9.4 % — SIGNIFICANT CHANGE UP (ref 2–14)
MONOCYTES NFR BLD AUTO: 9.4 % — SIGNIFICANT CHANGE UP (ref 2–14)
NEUTROPHILS # BLD AUTO: 4.05 K/UL — SIGNIFICANT CHANGE UP (ref 1.8–7.4)
NEUTROPHILS # BLD AUTO: 4.05 K/UL — SIGNIFICANT CHANGE UP (ref 1.8–7.4)
NEUTROPHILS NFR BLD AUTO: 49.1 % — SIGNIFICANT CHANGE UP (ref 43–77)
NEUTROPHILS NFR BLD AUTO: 49.1 % — SIGNIFICANT CHANGE UP (ref 43–77)
NITRITE UR-MCNC: NEGATIVE — SIGNIFICANT CHANGE UP
NITRITE UR-MCNC: NEGATIVE — SIGNIFICANT CHANGE UP
NRBC # BLD: 0 /100 WBCS — SIGNIFICANT CHANGE UP (ref 0–0)
NRBC # BLD: 0 /100 WBCS — SIGNIFICANT CHANGE UP (ref 0–0)
NT-PROBNP SERPL-SCNC: 67 PG/ML — SIGNIFICANT CHANGE UP
NT-PROBNP SERPL-SCNC: 67 PG/ML — SIGNIFICANT CHANGE UP
PH UR: 6.5 — SIGNIFICANT CHANGE UP (ref 5–8)
PH UR: 6.5 — SIGNIFICANT CHANGE UP (ref 5–8)
PLATELET # BLD AUTO: 169 K/UL — SIGNIFICANT CHANGE UP (ref 150–400)
PLATELET # BLD AUTO: 169 K/UL — SIGNIFICANT CHANGE UP (ref 150–400)
POTASSIUM SERPL-MCNC: 4.4 MMOL/L — SIGNIFICANT CHANGE UP (ref 3.5–5.3)
POTASSIUM SERPL-MCNC: 4.4 MMOL/L — SIGNIFICANT CHANGE UP (ref 3.5–5.3)
POTASSIUM SERPL-SCNC: 4.4 MMOL/L — SIGNIFICANT CHANGE UP (ref 3.5–5.3)
POTASSIUM SERPL-SCNC: 4.4 MMOL/L — SIGNIFICANT CHANGE UP (ref 3.5–5.3)
PROT SERPL-MCNC: 7.3 G/DL — SIGNIFICANT CHANGE UP (ref 6.4–8.2)
PROT SERPL-MCNC: 7.3 G/DL — SIGNIFICANT CHANGE UP (ref 6.4–8.2)
PROT UR-MCNC: NEGATIVE MG/DL — SIGNIFICANT CHANGE UP
PROT UR-MCNC: NEGATIVE MG/DL — SIGNIFICANT CHANGE UP
RBC # BLD: 4.09 M/UL — LOW (ref 4.2–5.8)
RBC # BLD: 4.09 M/UL — LOW (ref 4.2–5.8)
RBC # FLD: 14.2 % — SIGNIFICANT CHANGE UP (ref 10.3–14.5)
RBC # FLD: 14.2 % — SIGNIFICANT CHANGE UP (ref 10.3–14.5)
RSV RNA NPH QL NAA+NON-PROBE: SIGNIFICANT CHANGE UP
RSV RNA NPH QL NAA+NON-PROBE: SIGNIFICANT CHANGE UP
SARS-COV-2 RNA SPEC QL NAA+PROBE: DETECTED
SARS-COV-2 RNA SPEC QL NAA+PROBE: DETECTED
SODIUM SERPL-SCNC: 134 MMOL/L — SIGNIFICANT CHANGE UP (ref 132–145)
SODIUM SERPL-SCNC: 134 MMOL/L — SIGNIFICANT CHANGE UP (ref 132–145)
SP GR SPEC: 1.01 — SIGNIFICANT CHANGE UP (ref 1–1.03)
SP GR SPEC: 1.01 — SIGNIFICANT CHANGE UP (ref 1–1.03)
TROPONIN I, HIGH SENSITIVITY RESULT: 7.2 NG/L — SIGNIFICANT CHANGE UP
TROPONIN I, HIGH SENSITIVITY RESULT: 7.2 NG/L — SIGNIFICANT CHANGE UP
UROBILINOGEN FLD QL: 1 MG/DL — SIGNIFICANT CHANGE UP (ref 0.2–1)
UROBILINOGEN FLD QL: 1 MG/DL — SIGNIFICANT CHANGE UP (ref 0.2–1)
WBC # BLD: 8.23 K/UL — SIGNIFICANT CHANGE UP (ref 3.8–10.5)
WBC # BLD: 8.23 K/UL — SIGNIFICANT CHANGE UP (ref 3.8–10.5)
WBC # FLD AUTO: 8.23 K/UL — SIGNIFICANT CHANGE UP (ref 3.8–10.5)
WBC # FLD AUTO: 8.23 K/UL — SIGNIFICANT CHANGE UP (ref 3.8–10.5)

## 2023-12-16 PROCEDURE — 99285 EMERGENCY DEPT VISIT HI MDM: CPT

## 2023-12-16 PROCEDURE — 71045 X-RAY EXAM CHEST 1 VIEW: CPT | Mod: 26

## 2023-12-16 RX ORDER — ALBUTEROL 90 UG/1
1 AEROSOL, METERED ORAL ONCE
Refills: 0 | Status: COMPLETED | OUTPATIENT
Start: 2023-12-16 | End: 2023-12-16

## 2023-12-16 RX ORDER — DEXAMETHASONE 0.5 MG/5ML
6 ELIXIR ORAL ONCE
Refills: 0 | Status: COMPLETED | OUTPATIENT
Start: 2023-12-16 | End: 2023-12-16

## 2023-12-16 RX ORDER — SODIUM CHLORIDE 9 MG/ML
1000 INJECTION, SOLUTION INTRAVENOUS ONCE
Refills: 0 | Status: COMPLETED | OUTPATIENT
Start: 2023-12-16 | End: 2023-12-16

## 2023-12-16 RX ADMIN — SODIUM CHLORIDE 1000 MILLILITER(S): 9 INJECTION, SOLUTION INTRAVENOUS at 01:54

## 2023-12-16 RX ADMIN — ALBUTEROL 1 PUFF(S): 90 AEROSOL, METERED ORAL at 03:41

## 2023-12-16 RX ADMIN — Medication 6 MILLIGRAM(S): at 03:41

## 2023-12-16 NOTE — ED ADULT NURSE NOTE - NSFALLHARMRISKINTERV_ED_ALL_ED
Assistance OOB with selected safe patient handling equipment if applicable/Assistance with ambulation/Communicate risk of Fall with Harm to all staff, patient, and family/Monitor gait and stability/Provide patient with walking aids/Provide visual cue: red socks, yellow wristband, yellow gown, etc/Reinforce activity limits and safety measures with patient and family/Bed in lowest position, wheels locked, appropriate side rails in place/Call bell, personal items and telephone in reach/Instruct patient to call for assistance before getting out of bed/chair/stretcher/Non-slip footwear applied when patient is off stretcher/[place] to call system/Physically safe environment - no spills, clutter or unnecessary equipment/Purposeful Proactive Rounding/Room/bathroom lighting operational, light cord in reach Assistance OOB with selected safe patient handling equipment if applicable/Assistance with ambulation/Communicate risk of Fall with Harm to all staff, patient, and family/Monitor gait and stability/Provide patient with walking aids/Provide visual cue: red socks, yellow wristband, yellow gown, etc/Reinforce activity limits and safety measures with patient and family/Bed in lowest position, wheels locked, appropriate side rails in place/Call bell, personal items and telephone in reach/Instruct patient to call for assistance before getting out of bed/chair/stretcher/Non-slip footwear applied when patient is off stretcher/[place] to call system/Physically safe environment - no spills, clutter or unnecessary equipment/Purposeful Proactive Rounding/Room/bathroom lighting operational, light cord in reach

## 2023-12-16 NOTE — ED PROVIDER NOTE - CARE PROVIDER_API CALL
Nancy Felix  Pulmonary Disease  [address] [place] [id number]  Phone: [phone]  Fax: [phone]  Follow Up Time:     your PMD,   Phone: (   )    -  Fax: (   )    -  Follow Up Time:    Nancy Felix  Pulmonary Disease  [address] [place] [id number]  Phone: [phone]  Fax: [phone]  Follow Up Time:     your PMD,   Phone: (   )    -  Fax: (   )    -  Follow Up Time:

## 2023-12-16 NOTE — ED PROVIDER NOTE - CLINICAL SUMMARY MEDICAL DECISION MAKING FREE TEXT BOX
pt with multiple medical conditions, noted episode of near syncope tonight with BP of 68/41, after taking his routine home meds plus a sleeping pill. Also noted few days of generalized weakness without other sx. Labs nonactionable, EKG and CXR unremarkable, given 1L of IVF en route to the ED with improvement in BP and has been stable throughout entire stay. Noted to be COVID positive, but no respiratory distress, non hypoxic without O2 requirement, sx have been ongoing for 4-5d, offered paxlovid with risks/benefits discussed in length at bedside, pt declined and will not likely benefit antiviral at current time. Medically stable for dc, f/u with PMD/pulm, pulse Ox provided, strict return precautions discussed, pt verbalized understanding

## 2023-12-16 NOTE — ED PROVIDER NOTE - PHYSICAL EXAMINATION
Gen - WDWN M, NAD, comfortable and non-toxic appearing  Skin - warm, dry, intact   HEENT - AT/NC, PERRL, EMOI, pupils 3mm b/l, no nasal discharge, airway patent, neck supple and FROM  CV - S1S2, R/R/R  Resp - CTAB, no r/r/w  GI - soft, NABS, slightly distended but NT, no rebound or guarding   MS - chronic 2+ pitting BLE edema, calves supple and NT, palpable symmetric pulses, No midline spinal tenderness or step off on palpation  Neuro - AxOx3, contracted extremities L>R, chronic generalized weakness/BLE weakness, no other focal deficits

## 2023-12-16 NOTE — ED PROVIDER NOTE - OBJECTIVE STATEMENT
74 yo M with PMH of BPHx, cervical spine injury in 2018 s/p spinal surgery chronic generalized b/l weakness, L arm contraction and bilateral lower extremity swelling, BIBA for near syncope tonight. Pt is on midodrine 10mg BID, took his usual dose of tizanidine and noted feeling worsening generalized weakness, tunnel vision, and took his BP and noted to be lower than usual. (lowest 68/41) and called EMS. Noted 1L of NS en route to the ED by EMS with improvement of BP to 106/70. Of note, pt reports taking "an extra sleeping pill tonight" and that might have dropped his BP too much. Denies fever, chills, trauma, fall, CP, SOB, palpitations, worsening BLE peripheral edema, focal weakness, numbness, tingling, paresthesia, N/V/D/C, HA, vertigo, diplopia, change in speech, change in urinary/bowel function, LOC, and malaise.

## 2023-12-16 NOTE — ED PROVIDER NOTE - PATIENT PORTAL LINK FT
You can access the FollowMyHealth Patient Portal offered by [facility] by registering at the following website: [email or web address]. By joining [facility]’s FollowMyHealth portal, you will also be able to view your health information using other applications (apps) compatible with our system. You can access the FollowMyHealth Patient Portal offered by [facility] by registering at the following website: [email or web address]. By joining [facility]’s FollowMyHealth portal, you will also be able to view your health information using other applications (apps) compatible with our system.

## 2023-12-16 NOTE — ED ADULT NURSE NOTE - OBJECTIVE STATEMENT
74 y/o male who is her for generalized weakness. pt was found to be hypotensive. pt on arrival is normal tensive. pt has no medical complaints at this time- IV placed, labs sent on cardiac monitor 72 y/o male who is her for generalized weakness. pt was found to be hypotensive. pt on arrival is normal tensive. pt has no medical complaints at this time- IV placed, labs sent on cardiac monitor

## 2023-12-16 NOTE — ED PROVIDER NOTE - NSICDXPASTMEDICALHX_GEN_ALL_CORE_FT
PAST MEDICAL HISTORY:  BPH without urinary obstruction     Glaucoma     H/O cervical spine surgery

## 2023-12-16 NOTE — ED PROVIDER NOTE - NSFOLLOWUPINSTRUCTIONS_ED_ALL_ED_FT
COVID-19  COVID-19 is an infection caused by a virus called SARS-CoV-2. Most people who get COVID-19 have mild to moderate symptoms. Some have little to no symptoms. In others, the virus may cause a severe infection.    What are the causes?  The human body, showing how the coronavirus travels from the air to a person's lungs.  COVID-19 is caused by a coronavirus. The virus may be in the air as droplets or as tiny specks of fluid (aerosols). It may also be on surfaces. You may catch the virus if you:  Breathe in droplets when a person with COVID-19 breathes, speaks, sings, coughs, or sneezes.  Touch something that has the virus on it and then touch your mouth, nose, or eyes.  What increases the risk?  Risk for infection:    You are more likely to get COVID-19 if:  You are within 6 ft (1.8 m) of a person who has COVID-19 for 15 minutes or longer.  You provide care to a person who has COVID-19.  You are in close contact with others. This includes hugging, kissing, or sharing utensils.  Risk for serious illness caused by COVID-19:    You are more likely to get very ill from COVID-19 if:  You have cancer.  You have a long-term (chronic) disease. This may be:  A chronic lung disease, such as pulmonary embolism, chronic obstructive pulmonary disease (COPD), or cystic fibrosis.  A disease that affects your body's defense system (immune system). If you have a weak immune system, you are said to be immunocompromised.  A serious heart condition, such as heart failure, coronary artery disease, or cardiomyopathy.  Diabetes.  Chronic kidney disease.  A liver disease, such as cirrhosis, nonalcoholic fatty liver disease, alcoholic liver disease, or autoimmune hepatitis.  You are obese.  You are pregnant or were just pregnant.  You have sickle cell disease.  What are the signs or symptoms?  Symptoms of COVID-19 can range from mild to severe. They may appear any time from 2 to 14 days after you are exposed. They include:  Fever or chills.  Shortness of breath or trouble breathing.  Feeling tired.  Headaches, body aches, or muscle aches.  A runny or stuffy nose.  Sneezing, coughing, or a sore throat.  New loss of taste or smell.  You may also have stomach problems, such as nausea, vomiting, or diarrhea.    In some cases, you may not have any symptoms.    How is this diagnosed?  A sample being collected by swabbing the nose.  COVID-19 may be diagnosed by testing a sample to check for the virus. The most common tests are the PCR test and the antigen test. Tests may be done in the lab or at home. They include:  Using a swab to take a sample of fluid from your nose.  Testing a sample of saliva from your mouth.  Testing a sample of mucus from your lungs (sputum).  How is this treated?  Treatment for COVID-19 depends on how severe your condition is.  Mild symptoms can be treated at home. You should rest, drink fluids, and take over-the-counter medicine.  If you have symptoms and risk factors, you may be prescribed a medicine that fights viruses (antiviral).  Severe symptoms may be treated in a hospital intensive care unit (ICU). Treatment may include:  Extra oxygen given through a tube in the nose, a face mask, or a hoang.  Medicines. These may include:  Antivirals, such as remdesivir.  Anti-inflammatories, such as corticosteroids. These help reduce inflammation.  Antithrombotics. These help prevent or treat blood clots.  Convalescent plasma. This helps boost your immune system.  Prone positioning. This is when you are laid on your stomach to help oxygen get into your lungs.  Infection control measures.  If you are at risk for a more serious illness, your health care provider may prescribe two medicines to help your immune system protect you. These are called long-acting monoclonal antibodies. They are given together every 6 months.    How is this prevented?  To protect yourself:    Get the vaccine or vaccine series if you meet the guidelines. You can even get the vaccine while you are pregnant or making breast milk (lactating).  Get an added dose of the vaccine if you are immunocompromised. This applies if you have had an organ transplant or if you have a condition that affects your immune system.  You should get the added dose 4 weeks after you got the first one.  If you get an mRNA vaccine, you will need to get 3 doses.  Talk to your provider about getting experimental monoclonal antibodies. This treatment can help prevent severe illness. It may be given to you if:  You are immunocompromised.  You cannot get the vaccine. You may not get the vaccine if you have a severe allergic reaction to it or to what it is made of.  You are not fully vaccinated.  You are in a place where there is COVID-19 and:  You are in close contact with someone who has COVID-19.  You are at high risk of being exposed.  You are at risk of illness from new variants of the virus.  To protect others:    If you have symptoms of COVID-19, take steps to stop the virus from spreading.  Stay home. Leave your house only to get medical care. Do not use public transit.  Do not travel while you are sick.  Wash your hands often with soap and water for at least 20 seconds. If soap and water are not available, use alcohol-based hand .  Make sure that all people in your household wash their hands well and often.  Cough or sneeze into a tissue or your sleeve or elbow. Do not cough or sneeze into your hand or into the air.  Where to find more information  Centers for Disease Control and Prevention (CDC): cdc.gov  World Health Organization (WHO): who.int  Get help right away if:  You have trouble breathing.  You have pain or pressure in your chest.  You are confused.  Your lips or fingernails turn blue.  You have trouble waking from sleep.  Your symptoms get worse.  These symptoms may be an emergency. Get help right away. Call 911.  Do not wait to see if the symptoms will go away.  Do not drive yourself to the hospital.

## 2023-12-16 NOTE — ED PROVIDER NOTE - WR INTERPRETATION 1
CXR negative - No infiltrates, No consolidation, No atelectasis seen, elevated hemidiaphragm on the L

## 2023-12-16 NOTE — ED PROVIDER NOTE - CARE PROVIDERS DIRECT ADDRESSES
,[email or web address]_Unknown [email or web address],DirectAddress_Unknown

## 2023-12-16 NOTE — ED ADULT TRIAGE NOTE - CHIEF COMPLAINT QUOTE
Pt brought in by EMS for complaint of hypotension. Pt reports history of spinal surgery and wheelchair bound  stating he take midodrine 2X/day. Pt reports feeling weak, "like the lights are going out" tonight. His wife took his BP with SBP in the 60s, denies any syncopal episode. Given 1L NS IV by EMS and arrived with BP of 106/70. Reports feeling better with weakness improved but still "there a little."

## 2023-12-18 DIAGNOSIS — U07.1 COVID-19: ICD-10-CM

## 2023-12-18 DIAGNOSIS — R53.1 WEAKNESS: ICD-10-CM

## 2023-12-18 DIAGNOSIS — R00.1 BRADYCARDIA, UNSPECIFIED: ICD-10-CM

## 2024-03-06 NOTE — ED ADULT NURSE NOTE - NS ED NURSE LEVEL OF CONSCIOUSNESS SPEECH
[No Acute Distress] : no acute distress [Normal Oropharynx] : normal oropharynx [Normal Appearance] : normal appearance [No Neck Mass] : no neck mass [Normal Rate/Rhythm] : normal rate/rhythm [No Murmurs] : no murmurs [Normal S1, S2] : normal s1, s2 [No Resp Distress] : no resp distress [No Abnormalities] : no abnormalities [Clear to Auscultation Bilaterally] : clear to auscultation bilaterally [Benign] : benign [No Clubbing] : no clubbing [Normal Gait] : normal gait [No Cyanosis] : no cyanosis [No Edema] : no edema [No Focal Deficits] : no focal deficits Speaking Coherently/Age appropriate [Normal Color/ Pigmentation] : normal color/ pigmentation [Oriented x3] : oriented x3 [Normal Affect] : normal affect [TextBox_2] : obese

## 2024-03-30 NOTE — ED ADULT NURSE NOTE - CCCP TRG CHIEF CMPLNT
weakness Please see  Consultation Liaison Assessment Note from 3/30 (today) for Addiction Medicine consult evaluation and recommendations.

## 2024-11-08 NOTE — PHYSICAL THERAPY INITIAL EVALUATION ADULT - BED MOBILITY LIMITATIONS, REHAB EVAL
decreased ability to use arms for pushing/pulling/decreased ability to use legs for bridging/pushing/impaired ability to control trunk for mobility no

## 2025-02-11 ENCOUNTER — INPATIENT (INPATIENT)
Facility: HOSPITAL | Age: 75
LOS: 1 days | Discharge: HOME HEALTH | End: 2025-02-13
Attending: STUDENT IN AN ORGANIZED HEALTH CARE EDUCATION/TRAINING PROGRAM | Admitting: STUDENT IN AN ORGANIZED HEALTH CARE EDUCATION/TRAINING PROGRAM
Payer: MEDICARE

## 2025-02-11 VITALS
RESPIRATION RATE: 18 BRPM | TEMPERATURE: 100 F | WEIGHT: 179.9 LBS | SYSTOLIC BLOOD PRESSURE: 146 MMHG | OXYGEN SATURATION: 92 % | DIASTOLIC BLOOD PRESSURE: 64 MMHG | HEART RATE: 125 BPM

## 2025-02-11 DIAGNOSIS — N39.0 URINARY TRACT INFECTION, SITE NOT SPECIFIED: ICD-10-CM

## 2025-02-11 DIAGNOSIS — H40.9 UNSPECIFIED GLAUCOMA: ICD-10-CM

## 2025-02-11 DIAGNOSIS — R60.0 LOCALIZED EDEMA: ICD-10-CM

## 2025-02-11 DIAGNOSIS — R03.0 ELEVATED BLOOD-PRESSURE READING, WITHOUT DIAGNOSIS OF HYPERTENSION: ICD-10-CM

## 2025-02-11 DIAGNOSIS — E83.42 HYPOMAGNESEMIA: ICD-10-CM

## 2025-02-11 DIAGNOSIS — J96.01 ACUTE RESPIRATORY FAILURE WITH HYPOXIA: ICD-10-CM

## 2025-02-11 DIAGNOSIS — D64.9 ANEMIA, UNSPECIFIED: ICD-10-CM

## 2025-02-11 DIAGNOSIS — A41.9 SEPSIS, UNSPECIFIED ORGANISM: ICD-10-CM

## 2025-02-11 PROBLEM — Z98.890 OTHER SPECIFIED POSTPROCEDURAL STATES: Chronic | Status: ACTIVE | Noted: 2023-12-16

## 2025-02-11 LAB
ALBUMIN SERPL ELPH-MCNC: 3.6 G/DL — SIGNIFICANT CHANGE UP (ref 3.4–5)
ALP SERPL-CCNC: 95 U/L — SIGNIFICANT CHANGE UP (ref 40–120)
ALT FLD-CCNC: 23 U/L — SIGNIFICANT CHANGE UP (ref 12–42)
ANION GAP SERPL CALC-SCNC: 7 MMOL/L — LOW (ref 9–16)
APPEARANCE UR: ABNORMAL
APTT BLD: 31.6 SEC — SIGNIFICANT CHANGE UP (ref 24.5–35.6)
AST SERPL-CCNC: 21 U/L — SIGNIFICANT CHANGE UP (ref 15–37)
BASOPHILS # BLD AUTO: 0 K/UL — SIGNIFICANT CHANGE UP (ref 0–0.2)
BASOPHILS # BLD AUTO: 0.07 K/UL — SIGNIFICANT CHANGE UP (ref 0–0.2)
BASOPHILS NFR BLD AUTO: 0 % — SIGNIFICANT CHANGE UP (ref 0–2)
BASOPHILS NFR BLD AUTO: 0.3 % — SIGNIFICANT CHANGE UP (ref 0–2)
BILIRUB SERPL-MCNC: 1 MG/DL — SIGNIFICANT CHANGE UP (ref 0.2–1.2)
BILIRUB UR-MCNC: NEGATIVE — SIGNIFICANT CHANGE UP
BUN SERPL-MCNC: 12 MG/DL — SIGNIFICANT CHANGE UP (ref 7–23)
CALCIUM SERPL-MCNC: 8.7 MG/DL — SIGNIFICANT CHANGE UP (ref 8.5–10.5)
CHLORIDE SERPL-SCNC: 100 MMOL/L — SIGNIFICANT CHANGE UP (ref 96–108)
CO2 SERPL-SCNC: 28 MMOL/L — SIGNIFICANT CHANGE UP (ref 22–31)
COLOR SPEC: YELLOW — SIGNIFICANT CHANGE UP
CREAT SERPL-MCNC: 0.92 MG/DL — SIGNIFICANT CHANGE UP (ref 0.5–1.3)
DIFF PNL FLD: ABNORMAL
EGFR: 87 ML/MIN/1.73M2 — SIGNIFICANT CHANGE UP
EOSINOPHIL # BLD AUTO: 0 K/UL — SIGNIFICANT CHANGE UP (ref 0–0.5)
EOSINOPHIL # BLD AUTO: 0.2 K/UL — SIGNIFICANT CHANGE UP (ref 0–0.5)
EOSINOPHIL NFR BLD AUTO: 0 % — SIGNIFICANT CHANGE UP (ref 0–6)
EOSINOPHIL NFR BLD AUTO: 0.9 % — SIGNIFICANT CHANGE UP (ref 0–6)
FERRITIN SERPL-MCNC: 216 NG/ML — SIGNIFICANT CHANGE UP (ref 30–400)
FLUAV AG NPH QL: SIGNIFICANT CHANGE UP
FLUBV AG NPH QL: SIGNIFICANT CHANGE UP
GLUCOSE SERPL-MCNC: 134 MG/DL — HIGH (ref 70–99)
GLUCOSE UR QL: NEGATIVE MG/DL — SIGNIFICANT CHANGE UP
HCT VFR BLD CALC: 34.9 % — LOW (ref 39–50)
HCT VFR BLD CALC: 37.8 % — LOW (ref 39–50)
HGB BLD-MCNC: 10.7 G/DL — LOW (ref 13–17)
HGB BLD-MCNC: 11.7 G/DL — LOW (ref 13–17)
IMM GRANULOCYTES NFR BLD AUTO: 1.4 % — HIGH (ref 0–0.9)
INR BLD: 1.28 — HIGH (ref 0.85–1.16)
IRON SATN MFR SERPL: 14 UG/DL — LOW (ref 45–165)
IRON SATN MFR SERPL: 5 % — LOW (ref 16–55)
KETONES UR-MCNC: NEGATIVE MG/DL — SIGNIFICANT CHANGE UP
LACTATE BLDV-MCNC: 1.3 MMOL/L — SIGNIFICANT CHANGE UP (ref 0.5–2)
LEUKOCYTE ESTERASE UR-ACNC: ABNORMAL
LYMPHOCYTES # BLD AUTO: 0.56 K/UL — LOW (ref 1–3.3)
LYMPHOCYTES # BLD AUTO: 1.57 K/UL — SIGNIFICANT CHANGE UP (ref 1–3.3)
LYMPHOCYTES # BLD AUTO: 2.6 % — LOW (ref 13–44)
LYMPHOCYTES # BLD AUTO: 7 % — LOW (ref 13–44)
MAGNESIUM SERPL-MCNC: 1.6 MG/DL — SIGNIFICANT CHANGE UP (ref 1.6–2.6)
MCHC RBC-ENTMCNC: 27.6 PG — SIGNIFICANT CHANGE UP (ref 27–34)
MCHC RBC-ENTMCNC: 27.9 PG — SIGNIFICANT CHANGE UP (ref 27–34)
MCHC RBC-ENTMCNC: 30.7 G/DL — LOW (ref 32–36)
MCHC RBC-ENTMCNC: 31 G/DL — LOW (ref 32–36)
MCV RBC AUTO: 89.2 FL — SIGNIFICANT CHANGE UP (ref 80–100)
MCV RBC AUTO: 90.9 FL — SIGNIFICANT CHANGE UP (ref 80–100)
MONOCYTES # BLD AUTO: 0.45 K/UL — SIGNIFICANT CHANGE UP (ref 0–0.9)
MONOCYTES # BLD AUTO: 0.88 K/UL — SIGNIFICANT CHANGE UP (ref 0–0.9)
MONOCYTES NFR BLD AUTO: 2 % — SIGNIFICANT CHANGE UP (ref 2–14)
MONOCYTES NFR BLD AUTO: 4.1 % — SIGNIFICANT CHANGE UP (ref 2–14)
NEUTROPHILS # BLD AUTO: 19.22 K/UL — HIGH (ref 1.8–7.4)
NEUTROPHILS # BLD AUTO: 20.36 K/UL — HIGH (ref 1.8–7.4)
NEUTROPHILS NFR BLD AUTO: 90.7 % — HIGH (ref 43–77)
NEUTROPHILS NFR BLD AUTO: 91 % — HIGH (ref 43–77)
NITRITE UR-MCNC: POSITIVE
NRBC # BLD: 0 /100 WBCS — SIGNIFICANT CHANGE UP (ref 0–0)
NRBC BLD AUTO-RTO: SIGNIFICANT CHANGE UP /100 WBCS (ref 0–0)
NRBC BLD-RTO: 0 /100 WBCS — SIGNIFICANT CHANGE UP (ref 0–0)
NT-PROBNP SERPL-SCNC: 146 PG/ML — SIGNIFICANT CHANGE UP
PCO2 BLDV: 47 MMHG — SIGNIFICANT CHANGE UP (ref 42–55)
PH BLDV: 7.34 — SIGNIFICANT CHANGE UP (ref 7.32–7.43)
PH UR: 6 — SIGNIFICANT CHANGE UP (ref 5–8)
PLATELET # BLD AUTO: 168 K/UL — SIGNIFICANT CHANGE UP (ref 150–400)
PLATELET # BLD AUTO: 182 K/UL — SIGNIFICANT CHANGE UP (ref 150–400)
PO2 BLDV: 138 MMHG — HIGH (ref 25–45)
POTASSIUM SERPL-MCNC: 4.2 MMOL/L — SIGNIFICANT CHANGE UP (ref 3.5–5.3)
POTASSIUM SERPL-SCNC: 4.2 MMOL/L — SIGNIFICANT CHANGE UP (ref 3.5–5.3)
PROCALCITONIN SERPL-MCNC: 1.29 NG/ML — HIGH (ref 0.02–0.1)
PROT SERPL-MCNC: 8.2 G/DL — SIGNIFICANT CHANGE UP (ref 6.4–8.2)
PROT UR-MCNC: ABNORMAL MG/DL
PROTHROM AB SERPL-ACNC: 14.9 SEC — HIGH (ref 9.9–13.4)
RBC # BLD: 3.84 M/UL — LOW (ref 4.2–5.8)
RBC # BLD: 4.24 M/UL — SIGNIFICANT CHANGE UP (ref 4.2–5.8)
RBC # FLD: 14.6 % — HIGH (ref 10.3–14.5)
RBC # FLD: 14.7 % — HIGH (ref 10.3–14.5)
RSV RNA NPH QL NAA+NON-PROBE: SIGNIFICANT CHANGE UP
SAO2 % BLDV: 99.7 % — HIGH (ref 67–88)
SARS-COV-2 RNA SPEC QL NAA+PROBE: SIGNIFICANT CHANGE UP
SODIUM SERPL-SCNC: 135 MMOL/L — SIGNIFICANT CHANGE UP (ref 132–145)
SP GR SPEC: 1.08 — HIGH (ref 1–1.03)
TIBC SERPL-MCNC: 264 UG/DL — SIGNIFICANT CHANGE UP (ref 220–430)
TROPONIN I, HIGH SENSITIVITY RESULT: 111.7 NG/L — HIGH
TROPONIN I, HIGH SENSITIVITY RESULT: 75.5 NG/L — SIGNIFICANT CHANGE UP
TROPONIN T, HIGH SENSITIVITY RESULT: 42 NG/L — SIGNIFICANT CHANGE UP (ref 0–51)
UIBC SERPL-MCNC: 250 UG/DL — SIGNIFICANT CHANGE UP (ref 110–370)
UROBILINOGEN FLD QL: 1 MG/DL — SIGNIFICANT CHANGE UP (ref 0.2–1)
WBC # BLD: 21.23 K/UL — HIGH (ref 3.8–10.5)
WBC # BLD: 22.37 K/UL — HIGH (ref 3.8–10.5)
WBC # FLD AUTO: 21.23 K/UL — HIGH (ref 3.8–10.5)
WBC # FLD AUTO: 22.37 K/UL — HIGH (ref 3.8–10.5)

## 2025-02-11 PROCEDURE — 99291 CRITICAL CARE FIRST HOUR: CPT

## 2025-02-11 PROCEDURE — 99223 1ST HOSP IP/OBS HIGH 75: CPT | Mod: GC

## 2025-02-11 PROCEDURE — 71275 CT ANGIOGRAPHY CHEST: CPT | Mod: 26

## 2025-02-11 PROCEDURE — 71045 X-RAY EXAM CHEST 1 VIEW: CPT | Mod: 26

## 2025-02-11 RX ORDER — AZITHROMYCIN DIHYDRATE 500 MG/1
500 TABLET, FILM COATED ORAL ONCE
Refills: 0 | Status: COMPLETED | OUTPATIENT
Start: 2025-02-11 | End: 2025-02-11

## 2025-02-11 RX ORDER — MIDODRINE HYDROCHLORIDE 5 MG/1
1 TABLET ORAL
Refills: 0 | DISCHARGE

## 2025-02-11 RX ORDER — ACETAMINOPHEN, DIPHENHYDRAMINE HCL, PHENYLEPHRINE HCL 325; 25; 5 MG/1; MG/1; MG/1
3 TABLET ORAL AT BEDTIME
Refills: 0 | Status: DISCONTINUED | OUTPATIENT
Start: 2025-02-11 | End: 2025-02-13

## 2025-02-11 RX ORDER — TAMSULOSIN HYDROCHLORIDE 0.4 MG/1
0.8 CAPSULE ORAL AT BEDTIME
Refills: 0 | Status: DISCONTINUED | OUTPATIENT
Start: 2025-02-11 | End: 2025-02-13

## 2025-02-11 RX ORDER — GABAPENTIN 800 MG/1
300 TABLET ORAL EVERY 24 HOURS
Refills: 0 | Status: DISCONTINUED | OUTPATIENT
Start: 2025-02-11 | End: 2025-02-13

## 2025-02-11 RX ORDER — BACTERIOSTATIC SODIUM CHLORIDE 0.9 %
1000 VIAL (ML) INJECTION ONCE
Refills: 0 | Status: COMPLETED | OUTPATIENT
Start: 2025-02-11 | End: 2025-02-11

## 2025-02-11 RX ORDER — CEFPODOXIME PROXETIL 200 MG
1 TABLET ORAL
Qty: 14 | Refills: 0
Start: 2025-02-11 | End: 2025-02-17

## 2025-02-11 RX ORDER — GABAPENTIN 800 MG/1
1 TABLET ORAL
Refills: 0 | DISCHARGE

## 2025-02-11 RX ORDER — TIZANIDINE HCL 4 MG
4 TABLET ORAL EVERY 24 HOURS
Refills: 0 | Status: DISCONTINUED | OUTPATIENT
Start: 2025-02-12 | End: 2025-02-13

## 2025-02-11 RX ORDER — ACETAMINOPHEN 160 MG/5ML
1000 SUSPENSION ORAL ONCE
Refills: 0 | Status: COMPLETED | OUTPATIENT
Start: 2025-02-11 | End: 2025-02-11

## 2025-02-11 RX ORDER — CEFTRIAXONE 250 MG/1
2000 INJECTION, POWDER, FOR SOLUTION INTRAMUSCULAR; INTRAVENOUS ONCE
Refills: 0 | Status: COMPLETED | OUTPATIENT
Start: 2025-02-11 | End: 2025-02-11

## 2025-02-11 RX ORDER — VANCOMYCIN HYDROCHLORIDE 50 MG/ML
1000 KIT ORAL ONCE
Refills: 0 | Status: COMPLETED | OUTPATIENT
Start: 2025-02-11 | End: 2025-02-11

## 2025-02-11 RX ORDER — ACETAMINOPHEN 160 MG/5ML
975 SUSPENSION ORAL ONCE
Refills: 0 | Status: COMPLETED | OUTPATIENT
Start: 2025-02-11 | End: 2025-02-11

## 2025-02-11 RX ORDER — TIZANIDINE HCL 4 MG
2 TABLET ORAL AT BEDTIME
Refills: 0 | Status: DISCONTINUED | OUTPATIENT
Start: 2025-02-11 | End: 2025-02-13

## 2025-02-11 RX ORDER — DORZOLAMIDE HYDROCHLORIDE AND TIMOLOL MALEATE OPHTHALMIC SOLUTION 20; 5 MG/ML; MG/ML
1 SOLUTION OPHTHALMIC EVERY 12 HOURS
Refills: 0 | Status: DISCONTINUED | OUTPATIENT
Start: 2025-02-11 | End: 2025-02-12

## 2025-02-11 RX ADMIN — ACETAMINOPHEN 400 MILLIGRAM(S): 160 SUSPENSION ORAL at 21:27

## 2025-02-11 RX ADMIN — ACETAMINOPHEN 1000 MILLIGRAM(S): 160 SUSPENSION ORAL at 22:30

## 2025-02-11 RX ADMIN — Medication 1000 MILLILITER(S): at 08:12

## 2025-02-11 RX ADMIN — CEFTRIAXONE 100 MILLIGRAM(S): 250 INJECTION, POWDER, FOR SOLUTION INTRAMUSCULAR; INTRAVENOUS at 10:38

## 2025-02-11 RX ADMIN — AZITHROMYCIN DIHYDRATE 255 MILLIGRAM(S): 500 TABLET, FILM COATED ORAL at 05:14

## 2025-02-11 RX ADMIN — Medication 500 MILLILITER(S): at 12:14

## 2025-02-11 RX ADMIN — ACETAMINOPHEN 975 MILLIGRAM(S): 160 SUSPENSION ORAL at 05:09

## 2025-02-11 RX ADMIN — ACETAMINOPHEN, DIPHENHYDRAMINE HCL, PHENYLEPHRINE HCL 3 MILLIGRAM(S): 325; 25; 5 TABLET ORAL at 23:20

## 2025-02-11 RX ADMIN — TAMSULOSIN HYDROCHLORIDE 0.8 MILLIGRAM(S): 0.4 CAPSULE ORAL at 23:20

## 2025-02-11 RX ADMIN — VANCOMYCIN HYDROCHLORIDE 250 MILLIGRAM(S): KIT at 06:42

## 2025-02-11 NOTE — H&P ADULT - NSHPPHYSICALEXAM_GEN_ALL_CORE
.  VITAL SIGNS:  T(C): 37.7 (02-11-25 @ 22:30), Max: 38.9 (02-11-25 @ 04:30)  T(F): 99.8 (02-11-25 @ 22:30), Max: 102 (02-11-25 @ 04:30)  HR: 105 (02-11-25 @ 22:30) (92 - 125)  BP: 133/69 (02-11-25 @ 22:30) (105/60 - 159/78)  BP(mean): 105 (02-11-25 @ 19:35) (93 - 105)  RR: 18 (02-11-25 @ 22:30) (16 - 19)  SpO2: 95% (02-11-25 @ 22:30) (92% - 98%)  Wt(kg): --    PHYSICAL EXAM:    Constitutional: resting comfortably in bed; NAD  ENT: no nasal discharge; no oropharyngeal erythema or exudates; MMM  Respiratory: CTA B/L; no W/R/R  Cardiac: +S1/S2; RRR; no murmur  Gastrointestinal: abdomen soft, NT/ND  Extremities: WWP, + pitting edema to bilateral LE; bilateral wrist in flexion contractures  Vascular: 2+ radial pulses B/L  Neurologic: AAOx3; 4/5 muscle strength in bilateral UE and LE

## 2025-02-11 NOTE — H&P ADULT - NSHPSOCIALHISTORY_GEN_ALL_CORE
Former smoker, 0.5-1PPD x30 years, quit 8 years ago.  Drinks 1 glass of wine nightly.  No recreational drug use.  Lives at home with wife. Uses wheelchair, bedbound for the past 5 years since his cervical spine injury.

## 2025-02-11 NOTE — ED CDU PROVIDER INITIAL DAY NOTE - CRITICAL CARE ATTENDING CONTRIBUTION TO CARE
72 yo M with PMH of BPHx, cervical spine injury in 2018 s/p spinal surgery, Bedbound with chronic upper extremity flexion contractures, with past medical history of glaucoma, hypotension on midodrine, benign prostate hypertrophy, on finasteride, on 40 mg daily of Lasix, on tamsulosin, who presents with fever chills and productive cough for 2 days.  Patient notes this evening the cough became worse as well as having increased dyspnea at rest associated with 1 episode of posttussive vomiting this evening decreased p.o. intake and 1 episode of loose stools.  Denies diarrhea.  Denies sick contacts or recent travel.  Patient is vaccinated for flu.  Denies recent hospitalizations or antibiotics.    Initiated sepsis on arrival at time of room triage.  Patient to get full dose antibiotics Vanco mycin and Rocephin as well as pancultures.  Will do viral swab if initial flu COVID RSV are negative will do RVP.  Patient is full code.  Will do full electrolytes as well as cardiac enzymes patient has history of CHF with unknown ejection fraction on Lasix 40 mg daily.

## 2025-02-11 NOTE — H&P ADULT - PROBLEM SELECTOR PLAN 4
Pt with normocytic anemia. Hb 11.7, MCV 89.2 (at baseline). Iron panel c/w iron deficiency anemia (5% sat and total iron 14).   - Maintain active T&S  - Treat iron deficiency outpatient once infection resolves

## 2025-02-11 NOTE — ED CDU PROVIDER INITIAL DAY NOTE - CARDIAC PEDAL EDEMA
FYI message reviewed.
Patient called and stated that she missed the  appointment because she is in the hospital    Please review    Thanks
absent

## 2025-02-11 NOTE — ED ADULT NURSE NOTE - OBJECTIVE STATEMENT
Pt presents aao3, 2LNC, vs as charted. Endorses ams prior to arrival. Fevers at home, hypoxic at home. Pt noted to be contracted, continent of bowel and bladder, piv placed, labs and cultures drawn and sent. Abx given as ordered, care rendered. Safely maintained.

## 2025-02-11 NOTE — ED PROVIDER NOTE - CRITICAL CARE ATTENDING CONTRIBUTION TO CARE
74 yo M with PMH of BPHx, cervical spine injury in 2018 s/p spinal surgery, Bedbound with chronic upper extremity flexion contractures, with past medical history of glaucoma, hypotension on midodrine, benign prostate hypertrophy, on finasteride, on 40 mg daily of Lasix, on tamsulosin, who presents with fever chills and productive cough for 2 days.  Patient notes this evening the cough became worse as well as having increased dyspnea at rest associated with 1 episode of posttussive vomiting this evening decreased p.o. intake and 1 episode of loose stools.  Denies diarrhea.  Denies sick contacts or recent travel.  Patient is vaccinated for flu.  Denies recent hospitalizations or antibiotics.    Initiated sepsis on arrival at time of room triage.  Patient to get full dose antibiotics Vanco mycin and Rocephin as well as pancultures.  Will do viral swab if initial flu COVID RSV are negative will do RVP.  Patient is full code.  Will do full electrolytes as well as cardiac enzymes patient has history of CHF with unknown ejection fraction on Lasix 40 mg daily.

## 2025-02-11 NOTE — ED CDU PROVIDER INITIAL DAY NOTE - SKIN, MLM
Instructions: This plan will send the code FBSE to the PM system.  DO NOT or CHANGE the price. Detail Level: Simple Price (Do Not Change): 0.00 Skin normal color for race, warm, dry and intact. No evidence of rash.

## 2025-02-11 NOTE — H&P ADULT - ATTENDING COMMENTS
74-year-old male with hx of cervical spine injury in 2018 s/p spinal surgery, bedbound with chronic upper extremity flexion contractures, BPH, b/l glaucoma, hypotension (on midodrine); presenting with 2-day history of chills, body, productive cough with white sputum and 1-month history of foul smelling urine. Found to have + UA and in sepsis. Admitted for sepsis 2/2 UTI and superimposed URI vs less likely CAP    Pt seen at bedside. in NAD. feels better, denies CP, SOB, vision changes, neck/back pain, abd pain. has one episode of loose stool yeterday, no further episodes. ROS+ wet cough, myalgias, malodorous urine, chronic LE edema. Denies dysuria. on PE: resting comfortably in bed, nad, AO x3, contracted b/l UE, MS 3/5 b/l UE/LE. sensation intact, MMM, s1s2 no murmur, CTA b/l. abd soft, NT. +2 pitting edema b/l LE. no warmth    Plan  -c/w ceftriaxone 2g QD   -complete sepsis fluids  -f/up blood cx, urine cx, legionella, strep ag, sputum cx    -c/w home meds  -monitor urine output

## 2025-02-11 NOTE — ED CDU PROVIDER INITIAL DAY NOTE - WR ORDER ID 1
Fluconazole was prescribed by Dr. Sanchez on 1/26/23. States it is an ongoing prescription because of Jardiance. Advised patient to contact Dr. Sanchez's office if medication needs to be adjusted.   Patient is going to contact Dr. Sanchez's office regarding refill.   Advised she needs an appointment with PCP if she is having yeast infection symptoms. Verbalized understanding. Will call back if needed.        [id number]

## 2025-02-11 NOTE — PATIENT PROFILE ADULT - DEAF OR HARD OF HEARING?
COMPLETE FEMALE EXAMINATION    HISTORY:    Izzy Bolaños is a 37 year old female who presents for a complete physical exam and follow up on other chronic health issues.     NEW: She had a hand injury in December at work. She had x-rays done in urgent care which were negative. She now notes that the hand gets sore still at times. She is in school currently so it gets sore frequently. She also cannot put weight on the knuckle of digits 4 and 5.     NEW: She has a lump on the left side of her anterior neck that is sometimes sore. She feels she first noticed it about 2 years ago. She notices it with certain movements and when her rigid jacket hits it.     Ongoing itchy skin and fine rash on hands with pinpoint vesicles. Family with eczema. She feels her skin overall has been more pruritic and she has redness on her face. She does lotion regularly but doesn't use a specific treatment.      She has a history of herpes. No recent flares. Valtrex as needed.     She has fatigue. Does a lot of manual labor. Sleeps okay. Very stressed with work and school. No depression. She does takes a multivitamin. Is a vegan but normal B12 on blood work 5/2022. Protein was very low on previous blood work. Normal ECG, CMP, trops, and TSH 5/2022. CBC with mild anemia, HGB 11.8 -5/2022 which has been an intermittent problem for her.     MENSTRUAL HISTORY:  Patient's last menstrual period was 02/06/2023 (approximate). Sexually active with same partner/only partner. Condoms for contraception. Regular periods. No vaginal concerns at this time. Last pap neg with neg HPV 6/2021.      SOCIAL HISTORY:    Social History     Tobacco Use   • Smoking status: Former     Types: Cigarettes   • Smokeless tobacco: Former     Quit date: 1/1/2011   Substance Use Topics   • Alcohol use: Yes     Alcohol/week: 0.0 standard drinks     Comment: Socially   • Drug use: No       MEDICAL HISTORY:  Past Medical History:   Diagnosis Date   • Abnormal Pap smear of  cervix    • Genital warts    • Pyelonephritis 2007       SURGICAL HISTORY:    Past Surgical History:   Procedure Laterality Date   • Nasal sinus surgery  03/09/2020   • Pap smear  05/2016    PAP HPV neg repeat 5 years.    • Pap smear, routine (inc 55716)  06/16/2021       FAMILY HISTORY:  Family History   Problem Relation Age of Onset   • Other Other         denies colon and breast cancer   • Other Other         denies early onset heart disease   • Other Other         denies thyroid disorder or diabetes       MEDICATIONS:   Current Outpatient Medications   Medication Sig   • Valacyclovir HCl 1000 MG Tab TAKE 1 TABLET BY MOUTH DAILY FOR 5 DAYS DURING OUTBREAK   • triamcinolone (ARISTOCORT) 0.1 % cream Apply 1 application topically 2 times daily.   • Bacillus Coagulans-Inulin (PROBIOTIC FORMULA PO)    • cetirizine (ZYRTEC) 10 MG tablet Take 10 mg by mouth daily.   • SUMAtriptan (IMITREX) 50 MG tablet TK 1-2 TS PO PRN FOR MIGRAINES ONCE D   • Ascorbic Acid (VITAMIN C) 1000 MG tablet Take 1,000 mg by mouth daily.   • ELDERBERRY PO Take 1 tablet by mouth daily.     No current facility-administered medications for this visit.        ALLERGIES:    ALLERGIES:   Allergen Reactions   • Acyclovir Other (See Comments)     Unable to urinate       I have reviewed the patient's medications and allergies, past medical, surgical, social and family history, updating these as appropriate.  See Histories section of the EMR for a display of this information.    REVIEW OF SYSTEMS:    See attached ROS      SCREENING :  Recent Review Flowsheet Data     Date 2/27/2023    Adult PHQ 2 Score 0    Adult PHQ 2 Interpretation No further screening needed    Little interest or pleasure in activity? Not at all    Feeling down, depressed or hopeless? Not at all    Trouble falling or staying asleep or sleeping all the time? Several days    Feeling tired or having little energy? More than half the days    Trouble concentrating on things such as reading  the newspaper or watching TV? Several days          DEPRESSION ASSESSMENT/PLAN:  Depression screening is negative no further plan needed.      Body mass index is 19.8 kg/m².    BMI ASSESSMENT/PLAN:  Patient BMI is within normal range.    PHYSICAL EXAMINATION:    Visit Vitals  /60   Pulse 85   Temp 98.4 °F (36.9 °C) (Oral)   Resp 12   Ht 5' 5\" (1.651 m)   Wt 54 kg (119 lb)   LMP 02/06/2023 (Approximate)   SpO2 100%   BMI 19.80 kg/m²     Wt Readings from Last 3 Encounters:   02/27/23 54 kg (119 lb)   02/10/23 52.2 kg (115 lb)   01/17/23 52.6 kg (116 lb)        General:  Well-appearing 37 year old female. In no apparent distress.  Well-groomed. Hygiene okay.   Eyes:  Pupils equal, round, reactive to light and accommodation.Conjunctivae pink.   HENT:   Bilateral external ears and canals are normal without cerumen impaction. TM are pearly gray without bulging or perforation. Oropharynx is clear. Hearing grossly intact.  Dentition good.   Neck:  Supple. No thyromegaly.  Trachea midline.  Respiratory:  Normal rate and depth of breathing. Respiratory effort normal. Lungs clear to auscultation bilaterally. No crackles, rhonchi or wheezes.    Cardiovascular:  Regular rate and rhythm. No murmurs.  2+ dorsalis pedis pulses bilaterally. No ankle or pedal edema.  Gastrointestinal:  Soft. Nontender. Non-distended. Normal bowel sounds. No pulsatile or other abdominal masses. No hepatosplenomegaly or splenomegaly.    Breasts:  Symmetric bilaterally. No masses or nodules. No dimpling.  No nipple discharge. No tenderness noted.  No lymphadenopathy in the supraclavicular, infraclavicular or regional nodes.   Genitalia: Deferred.  Musculoskeletal:  Full range of motion in all 4 extremities proximal and distal. No joint swelling or tenderness in right and left shoulders, elbows, wrists and fingers. No joint swelling or tenderness in left and right knees, ankles and toes.   Neurologic:  Alert and oriented times 3.  Gait is normal.   Mental status normal with no gross cognitive impairment.  Integumentary:  Warm. Dry. Pink. Few tiny pink dots on hand otherwise no obvious rash  Lymphatic:  No lymphadenopathy in submental or submandibular. Small palpable left anterior probable cervical node. No supraclavicular or infraclavicular lymphadenopathy. No axillary or inguinal/groin lymphadenopathy.    Psychiatric: Cooperative. Appropriate mood and affect. Normal judgment.  Patient denies homicidal and suicidal ideations.     RESULTS:    Pertinent labs and imaging studies reviewed.   Discussed risks, possible side effects, benefits of vaccines..    Lab Results   Component Value Date    CREATININE 0.61 05/11/2022    AST 20 05/11/2022    BILIRUBIN 0.4 05/11/2022    TSH 1.430 05/11/2022    WBC 6.3 05/11/2022    HGB 11.8 (L) 05/11/2022     05/11/2022    VITD25 50.5 10/16/2015          ASSESSMENT AND PLAN:        1. Annual physical exam    2. Right hand pain -Neg for fx on x-ray initially. Declined repeat imaging. Encouraged to rest hand and ice if possible. Tylenol and ibuprofen/naproxen okay.    3. Lump in neck -Likely a lymph node that is intermittently inflamed. US soft tissue ordered.    4. Other fatigue -Likely multifactorial. Blood work normal in the past. Will repeat BMP and H&H in one year.    5. Dyshidrotic eczema -Worsening per the patient. Start triamcinolone cream.    6. HSV-2 (herpes simplex virus 2) infection -Stable with Valtrex. No change.    7. Health maintenance -Vaccines deferred today. Pap done 6/2021, repeat 2026. Mammo at age 40.          Patient Instructions   Start triamcinolone cream to arms or hands twice daily as needed   Use OTC hydrocortisone cream to face as needed  Ice hand as needed, rest as much as possible  Take ibuprofen/naproxen sparingly for pain, tylenol is okay as needed  Continue current medications  Lotion body frequently   Get adequate protein and iron intake   See me in one year with fasting labs prior     All  of the above was discussed with the patient in details, questions were answered to the patient's satisfaction.  Patient left the office in stable condition with verbal and written instructions.     This is Margy Brunson NP student acting as a scribe for Laurel Atkins NP. The following dictation represents Laurel Atkins's complete evaluation of this patient.     This represents my complete evaluation of the patient seen by Margy Brunson NP student, Laurel Atkins NP   no

## 2025-02-11 NOTE — H&P ADULT - PROBLEM SELECTOR PLAN 1
Pt met SIRS criteria in the ED (Tmax 102F and , WBC 21.23). Lactate 1.3. CXR negative. RVP negative. CT angio PE negative. UA + small LE, nitrite, 48 WBCs and pt reports 1-month history of foul smelling urine. Sepsis likely 2/2 UTI. S/p 2L of NS bolus, Azithro 500mg IV x1, CTX 2g IV x1, Vanco 1g IV x1 in ED.   - Give 700cc of LR bolus vs hold off iso LE edema  - Follow up blood cultures  - Follow up urine culture  - Treat UTI as below   - Tylenol PRN temp >100.4F Pt met SIRS criteria in the ED (Tmax 102F and , WBC 21.23). Lactate 1.3. CXR negative. RVP negative. CT angio PE negative but +an ill-defined subpleural 7 mm GGO in the SAMANTHA (series 4 image 89) with L basilar atelectasis.  UA + small LE, nitrite, 48 WBCs and pt reports 1-month history of foul smelling urine. Sepsis likely 2/2 UTI. S/p 2L of NS bolus, Azithro 500mg IV x1, CTX 2g IV x1, Vanco 1g IV x1 in ED.   - Give 700cc of LR bolus for fluid resus  - Follow up blood cultures  - Follow up urine culture  - Treat UTI as below   - Tylenol PRN temp >100.4F

## 2025-02-11 NOTE — ED ADULT TRIAGE NOTE - CHIEF COMPLAINT QUOTE
Pt BIBA c/o shortness of breath. States has been having cough, fever, and chest congestion x1 day. Pt hypoxic on RA. Pt is bed bound s/t spinal injury 5 years ago.

## 2025-02-11 NOTE — ED ADULT NURSE NOTE - NSFALLRISKINTERV_ED_ALL_ED

## 2025-02-11 NOTE — ED CDU PROVIDER INITIAL DAY NOTE - OBJECTIVE STATEMENT
74 yo M with PMH of BPHx, cervical spine injury in 2018 s/p spinal surgery, Bedbound with chronic upper extremity flexion contractures, with past medical history of glaucoma, hypotension on midodrine, benign prostate hypertrophy, on finasteride, on 40 mg daily of Lasix, on tamsulosin, who presents with fever chills and productive cough for 2 days.  Patient notes this evening the cough became worse as well as having increased dyspnea at rest associated with 1 episode of posttussive vomiting this evening decreased p.o. intake and 1 episode of loose stools.  Denies diarrhea.  Denies sick contacts or recent travel.  Patient is vaccinated for flu.  Denies recent hospitalizations or antibiotics.

## 2025-02-11 NOTE — H&P ADULT - PROBLEM SELECTOR PLAN 5
Pt reports history of hypotension. On home Midodrine 10mg BID. BP currently normotensive.  - Hold home midodrine iso normotension   - Monitor BP

## 2025-02-11 NOTE — H&P ADULT - PROBLEM SELECTOR PLAN 10
Pt with BPH. On home Finasteride 5mg qd, Tamsulosin 0.8mg qhs and Tizanidine 4mg qAM and 2mg qhs.  - C/w home meds Pt with BPH. On home Finasteride 5mg qd, Tamsulosin 0.8mg qhs.  - C/w home meds

## 2025-02-11 NOTE — H&P ADULT - NSICDXPASTMEDICALHX_GEN_ALL_CORE_FT
PAST MEDICAL HISTORY:  BPH without urinary obstruction     Glaucoma     H/O cervical spine surgery     H/O hypotension

## 2025-02-11 NOTE — H&P ADULT - NSHPLABSRESULTS_GEN_ALL_CORE
10.7   22.37 )-----------( 168      ( 11 Feb 2025 08:30 )             34.9       02-11    135  |  100  |  12  ----------------------------<  134[H]  4.2   |  28  |  0.92    Ca    8.7      11 Feb 2025 05:07  Mg     1.6     02-11    TPro  8.2  /  Alb  3.6  /  TBili  1.0  /  DBili  x   /  AST  21  /  ALT  23  /  AlkPhos  95  02-11              Urinalysis Basic - ( 11 Feb 2025 05:07 )    Color: x / Appearance: x / SG: x / pH: x  Gluc: 134 mg/dL / Ketone: x  / Bili: x / Urobili: x   Blood: x / Protein: x / Nitrite: x   Leuk Esterase: x / RBC: x / WBC x   Sq Epi: x / Non Sq Epi: x / Bacteria: x        PT/INR - ( 11 Feb 2025 05:07 )   PT: 14.9 sec;   INR: 1.28          PTT - ( 11 Feb 2025 05:07 )  PTT:31.6 sec    Lactate Trend            CAPILLARY BLOOD GLUCOSE

## 2025-02-11 NOTE — H&P ADULT - PROBLEM SELECTOR PLAN 6
Pt with bilateral LE edema, takes Lasix 40mg qd.   - C/w Lasix 40mg qd Pt with bilateral LE edema, takes Lasix 40mg qd.   - C/w Lasix 40mg qd  - Duplex US bilateral LE to r/o DVT (pt bedbound)

## 2025-02-11 NOTE — ED CDU PROVIDER INITIAL DAY NOTE - PROGRESS NOTE DETAILS
Will sign out to Dr. Bailon pending repeat troponin and slow fluid hydration.  Patient is declining admission also awaiting RVP.  I called lab 50 minutes to RVP finalized.

## 2025-02-11 NOTE — H&P ADULT - HISTORY OF PRESENT ILLNESS
74-year-old male with hx of cervical spine injury in 2018 s/p spinal surgery, bedbound with chronic upper extremity flexion contractures, BPH, b/l glaucoma, hypotension (on midodrine); presenting with 2-day history of chills, body, productive cough with white sputum, nasal congestion, headache. Reports associated worsening dyspnea since last night. ROS: +1 episode of loose stools; + foul smelling urine for the past month. Denies any chest pain, sore throat, abdominal pain, n/v, dysuria, hematuria.     ED course:   Initial vital signs: T99.6F (max 102F),  -> 110s, /64 -> 159/78, RR 18, SpO2 92% on 2L NC -> 96% on 2L NC  Labs significant for: WBC 21.23 (neut predominant), Hb 11.7, MCV 89.2, INR 1.28, Troponin I 75 -> 111, Pro-, Mg 1.6, lactate 1.3, full RVP negative   UA: cloudy, spec gravity 1.085, trace protein, small blood, small LE, + nitrite, 48 WBC, 34 RBC, many bacteria    EKG: Sinus tachycardia at 123bpm, QT/QTc 300/429  CT angio PE: no PE. Borderline dilated main pulm artery (? pHTN)  CXR: clear lungs.  Medications: tylenol 975mg PO x1, Azithro 500mg IV x1, CTX 2g IV x1, Vanco 1g IV x1, 2L NS bolus x1  Consults: none 74-year-old male with hx of cervical spine injury in 2018 s/p spinal surgery, bedbound with chronic upper extremity flexion contractures, BPH, b/l glaucoma, hypotension (on midodrine); presenting with 2-day history of chills, body, productive cough with white sputum, nasal congestion, headache. Reports associated worsening dyspnea since last night. ROS: +1 episode of loose stools; + foul smelling urine for the past month. Denies any chest pain, sore throat, abdominal pain, n/v, dysuria, hematuria.     ED course:   Initial vital signs: T99.6F (max 102F),  -> 110s, /64 -> 133/69, RR 18, SpO2 96% on 2L NC -> 95% on RA  Labs significant for: WBC 21.23 (neut predominant), Hb 11.7, MCV 89.2, INR 1.28, Troponin I 75 -> 111, Pro-, Mg 1.6, lactate 1.3, full RVP negative   UA: cloudy, spec gravity 1.085, trace protein, small blood, small LE, + nitrite, 48 WBC, 34 RBC, many bacteria    EKG: Sinus tachycardia at 123bpm, QT/QTc 300/429  CT angio PE: no PE. Borderline dilated main pulm artery (? pHTN)  CXR: clear lungs.  Medications: tylenol 975mg PO x1, Azithro 500mg IV x1, CTX 2g IV x1, Vanco 1g IV x1, 2L NS bolus x1  Consults: none 74-year-old male with hx of cervical spine injury in 2018 s/p spinal surgery, bedbound with chronic upper extremity flexion contractures, BPH, b/l glaucoma, hypotension (on midodrine); presenting with 2-day history of chills, body aches, productive cough with white sputum, nasal congestion, headache. Reports associated worsening dyspnea since last night. ROS: +1 episode of loose stools; + foul smelling urine for the past month. Denies any chest pain, sore throat, abdominal pain, n/v, dysuria, hematuria.     ED course:   Initial vital signs: T99.6F (max 102F),  -> 110s, /64 -> 133/69, RR 18, SpO2 96% on 2L NC -> 95% on RA  Labs significant for: WBC 21.23 (neut predominant), Hb 11.7, MCV 89.2, INR 1.28, Troponin I 75 -> 111, Pro-, Mg 1.6, lactate 1.3, full RVP negative   UA: cloudy, spec gravity 1.085, trace protein, small blood, small LE, + nitrite, 48 WBC, 34 RBC, many bacteria    EKG: Sinus tachycardia at 123bpm, QT/QTc 300/429  CT angio PE: no PE. + an ill-defined subpleural 7 mm GGO in the SAMANTHA (series 4 image 89) with L basilar atelectasis.  CXR: clear lungs.  Medications: tylenol 975mg PO x1, Azithro 500mg IV x1, CTX 2g IV x1, Vanco 1g IV x1, 2L NS bolus x1  Consults: none

## 2025-02-11 NOTE — H&P ADULT - PROBLEM SELECTOR PLAN 2
Pt febrile with 1-month history of foul smelling urine. Found to have + UA. S/p CTX 2g IV x1 in the ED.   - Follow up urine culture  - CTX 2g IV qd x7 days total (2/11- )

## 2025-02-11 NOTE — H&P ADULT - ASSESSMENT
74-year-old male with hx of cervical spine injury in 2018 s/p spinal surgery, bedbound with chronic upper extremity flexion contractures, BPH, b/l glaucoma, hypotension (on midodrine); presenting with 2-day history of chills, body, productive cough with white sputum and 1-month history of foul smelling urine. Found to have + UA and in sepsis. Admitted for sepsis 2/2 UTI.   74-year-old male with hx of cervical spine injury in 2018 s/p spinal surgery, bedbound with chronic upper extremity flexion contractures, BPH, b/l glaucoma, hypotension (on midodrine); presenting with 2-day history of chills, body aches, productive cough with white sputum and 1-month history of foul smelling urine. Found to have + UA and in sepsis. Admitted for sepsis 2/2 UTI +/- viral URI vs CAP.

## 2025-02-11 NOTE — H&P ADULT - PROBLEM SELECTOR PLAN 9
Pt with bilateral glaucoma. On home Dorzolamide ophthalmic solution to both eyes, Prednisolone to L eye q2d, and Vyzulta 0.024% to R eye qd.  - C/w home ophthalmic solutions

## 2025-02-11 NOTE — H&P ADULT - PROBLEM SELECTOR PLAN 8
Pt with history of cervical spine injury 5 years ago, s/p spinal surgery (unknown by pt), since then has been bedbound, uses wheelchair and has bilateral wrist contraction flexure.  - PT and OT consult Pt with history of cervical spine injury 5 years ago, s/p spinal surgery (unknown by pt), since then has been bedbound, uses wheelchair and has bilateral wrist contraction flexure. On home gabapentin 300mg qd.  - PT and OT consult  - C/w home med Pt with history of cervical spine injury 5 years ago, s/p spinal surgery (unknown by pt), since then has been bedbound, uses wheelchair and has bilateral wrist contraction flexure. On home gabapentin 300mg qd and Tizanidine 4mg qAM and 2mg qhs.  - PT and OT consult  - C/w home med

## 2025-02-11 NOTE — ED CDU PROVIDER DISPOSITION NOTE - CLINICAL COURSE
+ UTI with uptrending leukocytosis, presentation meeting sepsis criteria. treated with IV abx, will admit for continued iv abx pending culture results.

## 2025-02-11 NOTE — ED PROVIDER NOTE - PROGRESS NOTE DETAILS
Awaiting CTA results will likely admit as patient is hypoxic on room air to 94% with mild tachypnea Awaiting RVP results, patient taken off oxygen and satting 95% on room air with no increased work of breathing.  I discussed results with patient in detail including the CTA results.  He would prefer to be discharged home understanding that he can worsen at home.  I am awaiting RVP results and a second troponin for trending.  I explained this to patient and his spouse as well.  She would have to leave by 9 AM for doctor's appointment on [address]  If he is stable for discharge she can go home as there is a home health aide there until 3 PM.  Otherwise spouse can return for pickup.  Will require transfer home as he is bedbound.  Patient speaking in full sentences alert and oriented x 3 well-appearing.

## 2025-02-11 NOTE — ED ADULT NURSE NOTE - PAIN: BODY LOCATION
[FreeTextEntry6] : pus filled pimple on the dorsal surface of his right pinky went to UC yesterday and was drained started on bactrim - was prescribed tablets and is having a hard time taking them  afebrile redness has improved from yesterday  No FH of MRSA No personal history of frequent or purulent skin infections No family members in health care settings  generalized

## 2025-02-11 NOTE — H&P ADULT - PROBLEM SELECTOR PLAN 3
RESOLVED  Patient presenting with 2-day history  chills, body, productive cough with white sputum, nasal congestion, headache and 1-day history of worsening dyspnea. CXR clear. RVP negative. CT PE negative. Initially required 2L NC, now satting well on RA.  - Continue to monitor RESOLVED  Patient presenting with 2-day history  chills, body, productive cough with white sputum, nasal congestion, headache and 1-day history of worsening dyspnea. CXR clear. RVP negative. CT PE negative for PE but + an ill-defined subpleural 7 mm GGO in the SAMANTHA (series 4 image 89) with L basilar atelectasis. . Initially required 2L NC, now satting well on RA. There is a concern about CAP vs. viral PNA given symptoms, hypoxia and CT findings.  - Continue to monitor  - Obtain urine strep/legionella and sputum culture

## 2025-02-11 NOTE — ED PROVIDER NOTE - OBJECTIVE STATEMENT
74 yo M with PMH of BPHx, cervical spine injury in 2018 s/p spinal surgery 74 yo M with PMH of BPHx, cervical spine injury in 2018 s/p spinal surgery, Bedbound with chronic upper extremity flexion contractures, with past medical history of glaucoma, hypotension on midodrine, benign prostate hypertrophy, on finasteride, on 40 mg daily of Lasix, on tamsulosin, who presents with fever chills and productive cough for 2 days.  Patient notes this evening the cough became worse as well as having increased dyspnea at rest associated with 1 episode of posttussive vomiting this evening decreased p.o. intake and 1 episode of loose stools.  Denies diarrhea.  Denies sick contacts or recent travel.  Patient is vaccinated for flu.  Denies recent hospitalizations or antibiotics.

## 2025-02-12 ENCOUNTER — TRANSCRIPTION ENCOUNTER (OUTPATIENT)
Age: 75
End: 2025-02-12

## 2025-02-12 DIAGNOSIS — R09.89 OTHER SPECIFIED SYMPTOMS AND SIGNS INVOLVING THE CIRCULATORY AND RESPIRATORY SYSTEMS: ICD-10-CM

## 2025-02-12 DIAGNOSIS — Z86.79 PERSONAL HISTORY OF OTHER DISEASES OF THE CIRCULATORY SYSTEM: ICD-10-CM

## 2025-02-12 DIAGNOSIS — I21.A1 MYOCARDIAL INFARCTION TYPE 2: ICD-10-CM

## 2025-02-12 DIAGNOSIS — N40.0 BENIGN PROSTATIC HYPERPLASIA WITHOUT LOWER URINARY TRACT SYMPTOMS: ICD-10-CM

## 2025-02-12 DIAGNOSIS — Z29.9 ENCOUNTER FOR PROPHYLACTIC MEASURES, UNSPECIFIED: ICD-10-CM

## 2025-02-12 DIAGNOSIS — Z87.828 PERSONAL HISTORY OF OTHER (HEALED) PHYSICAL INJURY AND TRAUMA: ICD-10-CM

## 2025-02-12 LAB
ADD ON TEST-SPECIMEN IN LAB: SIGNIFICANT CHANGE UP
ANION GAP SERPL CALC-SCNC: 8 MMOL/L — SIGNIFICANT CHANGE UP (ref 5–17)
BASOPHILS # BLD AUTO: 0.03 K/UL — SIGNIFICANT CHANGE UP (ref 0–0.2)
BASOPHILS NFR BLD AUTO: 0.2 % — SIGNIFICANT CHANGE UP (ref 0–2)
BLD GP AB SCN SERPL QL: NEGATIVE — SIGNIFICANT CHANGE UP
BUN SERPL-MCNC: 7 MG/DL — SIGNIFICANT CHANGE UP (ref 7–23)
CALCIUM SERPL-MCNC: 8.5 MG/DL — SIGNIFICANT CHANGE UP (ref 8.4–10.5)
CHLORIDE SERPL-SCNC: 102 MMOL/L — SIGNIFICANT CHANGE UP (ref 96–108)
CO2 SERPL-SCNC: 25 MMOL/L — SIGNIFICANT CHANGE UP (ref 22–31)
CREAT SERPL-MCNC: 0.67 MG/DL — SIGNIFICANT CHANGE UP (ref 0.5–1.3)
EGFR: 98 ML/MIN/1.73M2 — SIGNIFICANT CHANGE UP
EOSINOPHIL # BLD AUTO: 0.01 K/UL — SIGNIFICANT CHANGE UP (ref 0–0.5)
EOSINOPHIL NFR BLD AUTO: 0.1 % — SIGNIFICANT CHANGE UP (ref 0–6)
GLUCOSE SERPL-MCNC: 98 MG/DL — SIGNIFICANT CHANGE UP (ref 70–99)
HCT VFR BLD CALC: 35.2 % — LOW (ref 39–50)
HGB BLD-MCNC: 10.4 G/DL — LOW (ref 13–17)
IMM GRANULOCYTES NFR BLD AUTO: 1 % — HIGH (ref 0–0.9)
LEGIONELLA AG UR QL: NEGATIVE — SIGNIFICANT CHANGE UP
LYMPHOCYTES # BLD AUTO: 1.47 K/UL — SIGNIFICANT CHANGE UP (ref 1–3.3)
LYMPHOCYTES # BLD AUTO: 9.5 % — LOW (ref 13–44)
MAGNESIUM SERPL-MCNC: 2 MG/DL — SIGNIFICANT CHANGE UP (ref 1.6–2.6)
MCHC RBC-ENTMCNC: 26.3 PG — LOW (ref 27–34)
MCHC RBC-ENTMCNC: 29.5 G/DL — LOW (ref 32–36)
MCV RBC AUTO: 88.9 FL — SIGNIFICANT CHANGE UP (ref 80–100)
MONOCYTES # BLD AUTO: 1.09 K/UL — HIGH (ref 0–0.9)
MONOCYTES NFR BLD AUTO: 7 % — SIGNIFICANT CHANGE UP (ref 2–14)
NEUTROPHILS # BLD AUTO: 12.78 K/UL — HIGH (ref 1.8–7.4)
NEUTROPHILS NFR BLD AUTO: 82.2 % — HIGH (ref 43–77)
NRBC BLD AUTO-RTO: 0 /100 WBCS — SIGNIFICANT CHANGE UP (ref 0–0)
PHOSPHATE SERPL-MCNC: 2.4 MG/DL — LOW (ref 2.5–4.5)
PLATELET # BLD AUTO: 160 K/UL — SIGNIFICANT CHANGE UP (ref 150–400)
POTASSIUM SERPL-MCNC: 3.8 MMOL/L — SIGNIFICANT CHANGE UP (ref 3.5–5.3)
POTASSIUM SERPL-SCNC: 3.8 MMOL/L — SIGNIFICANT CHANGE UP (ref 3.5–5.3)
RBC # BLD: 3.96 M/UL — LOW (ref 4.2–5.8)
RBC # FLD: 14.8 % — HIGH (ref 10.3–14.5)
RH IG SCN BLD-IMP: NEGATIVE — SIGNIFICANT CHANGE UP
S PNEUM AG UR QL: NEGATIVE — SIGNIFICANT CHANGE UP
SODIUM SERPL-SCNC: 135 MMOL/L — SIGNIFICANT CHANGE UP (ref 135–145)
WBC # BLD: 15.53 K/UL — HIGH (ref 3.8–10.5)
WBC # FLD AUTO: 15.53 K/UL — HIGH (ref 3.8–10.5)

## 2025-02-12 PROCEDURE — 93970 EXTREMITY STUDY: CPT | Mod: 26

## 2025-02-12 PROCEDURE — 99233 SBSQ HOSP IP/OBS HIGH 50: CPT | Mod: GC

## 2025-02-12 RX ORDER — SODIUM CHLORIDE 9 G/ML
700 INJECTION, SOLUTION INTRAVENOUS ONCE
Refills: 0 | Status: COMPLETED | OUTPATIENT
Start: 2025-02-12 | End: 2025-02-12

## 2025-02-12 RX ORDER — CEFPODOXIME PROXETIL 200 MG
1 TABLET ORAL
Qty: 8 | Refills: 0
Start: 2025-02-12 | End: 2025-02-15

## 2025-02-12 RX ORDER — MAGNESIUM SULFATE 0.8 MEQ/ML
2 AMPUL (ML) INJECTION ONCE
Refills: 0 | Status: COMPLETED | OUTPATIENT
Start: 2025-02-12 | End: 2025-02-12

## 2025-02-12 RX ORDER — LATANOPROST 50 UG/ML
1 SOLUTION OPHTHALMIC AT BEDTIME
Refills: 0 | Status: DISCONTINUED | OUTPATIENT
Start: 2025-02-12 | End: 2025-02-13

## 2025-02-12 RX ORDER — CEFTRIAXONE 250 MG/1
2000 INJECTION, POWDER, FOR SOLUTION INTRAMUSCULAR; INTRAVENOUS EVERY 24 HOURS
Refills: 0 | Status: DISCONTINUED | OUTPATIENT
Start: 2025-02-12 | End: 2025-02-13

## 2025-02-12 RX ORDER — DORZOLAMIDE HYDROCHLORIDE AND TIMOLOL MALEATE OPHTHALMIC SOLUTION 20; 5 MG/ML; MG/ML
1 SOLUTION OPHTHALMIC
Refills: 0 | Status: DISCONTINUED | OUTPATIENT
Start: 2025-02-12 | End: 2025-02-13

## 2025-02-12 RX ORDER — ENOXAPARIN SODIUM 100 MG/ML
40 INJECTION SUBCUTANEOUS EVERY 24 HOURS
Refills: 0 | Status: DISCONTINUED | OUTPATIENT
Start: 2025-02-12 | End: 2025-02-13

## 2025-02-12 RX ADMIN — SODIUM CHLORIDE 700 MILLILITER(S): 9 INJECTION, SOLUTION INTRAVENOUS at 02:10

## 2025-02-12 RX ADMIN — Medication 5 MILLIGRAM(S): at 05:31

## 2025-02-12 RX ADMIN — GABAPENTIN 300 MILLIGRAM(S): 800 TABLET ORAL at 05:31

## 2025-02-12 RX ADMIN — Medication 4 MILLIGRAM(S): at 05:40

## 2025-02-12 RX ADMIN — TAMSULOSIN HYDROCHLORIDE 0.8 MILLIGRAM(S): 0.4 CAPSULE ORAL at 22:45

## 2025-02-12 RX ADMIN — CEFTRIAXONE 100 MILLIGRAM(S): 250 INJECTION, POWDER, FOR SOLUTION INTRAMUSCULAR; INTRAVENOUS at 09:51

## 2025-02-12 RX ADMIN — ENOXAPARIN SODIUM 40 MILLIGRAM(S): 100 INJECTION SUBCUTANEOUS at 05:31

## 2025-02-12 RX ADMIN — Medication 25 GRAM(S): at 00:23

## 2025-02-12 RX ADMIN — DORZOLAMIDE HYDROCHLORIDE AND TIMOLOL MALEATE OPHTHALMIC SOLUTION 1 DROP(S): 20; 5 SOLUTION OPHTHALMIC at 05:34

## 2025-02-12 RX ADMIN — Medication 2 MILLIGRAM(S): at 22:46

## 2025-02-12 RX ADMIN — LATANOPROST 1 DROP(S): 50 SOLUTION OPHTHALMIC at 22:45

## 2025-02-12 RX ADMIN — DORZOLAMIDE HYDROCHLORIDE AND TIMOLOL MALEATE OPHTHALMIC SOLUTION 1 DROP(S): 20; 5 SOLUTION OPHTHALMIC at 23:12

## 2025-02-12 NOTE — DISCHARGE NOTE PROVIDER - PROVIDER TOKENS
FREE:[LAST:[Ashley],FIRST:[Peter],PHONE:[phone]],FAX:[(   [phone]    -],ADDRESS:[[address] [place] [address] [address] [place] [address]],SCHEDULEDAPPT:[02/20/2025],SCHEDULEDAPPTTIME:[10:00 AM],ESTABLISHEDPATIENT:[T]]

## 2025-02-12 NOTE — DISCHARGE NOTE PROVIDER - CARE PROVIDER_API CALL
Peter Ramirez  [address] [place] [address]  Phone: [phone]  Fax: (   )    -  Established Patient  Scheduled Appointment: 02/20/2025 10:00 AM

## 2025-02-12 NOTE — PROGRESS NOTE ADULT - PROBLEM SELECTOR PLAN 10
Pt with BPH. On home Finasteride 5mg qd, Tamsulosin 0.8mg qhs and Tizanidine 4mg qAM and 2mg qhs.  - C/w home meds Pt with bilateral glaucoma. On home Dorzolamide ophthalmic solution to both eyes, Prednisolone to L eye q2d, and Vyzulta 0.024% to R eye qd.  - C/w home ophthalmic solutions

## 2025-02-12 NOTE — CONSULT NOTE ADULT - ASSESSMENT
{\rtf1\srtyis73273\ansi\qcnpide2626\ftnbj\uc1\deff0  {\fonttbl{\f0 \fnil Segoe UI;}{\f1 \fnil \fcharset0 Segoe UI;}{\f2 \fnil Times New Doni;}}  {\colortbl ;\idz238\fyiuc664\crrh523 ;\red0\green0\blue0 ;\red0\green0\mucl464 ;\red0\green0\blue0 ;}  {\stylesheet{\f0\fs20 Normal;}{\cs1 Default Paragraph Font;}{\cs2\f0\fs16 Line Number;}{\cs3\f2\fs24\ul\cf3 Hyperlink;}}  {\*\revtbl{Unknown;}}  \zzfwnj50423\fsqbea84410\sbeyg7822\dyjzq1337\rbuas2644\owimc2652\vvewovd336\ppdnofm375\nogrowautofit\cqidva190\formshade\nofeaturethrottle1\dntblnsbdb\fet4\aendnotes\aftnnrlc\pgbrdrhead\pgbrdrfoot  \sectd\khyhyu82209\ivyydu12222\guttersxn0\vpvrkzmi2472\mxnzjany6888\uufrezaq1115\qrhvifvo2130\qdkurwt003\ajqcsmo341\sbkpage\pgncont\pgndec  \plain\plain\f0\fs24\ql\plain\f0\fs24\plain\f0\fs20\bvaw2657\hich\f0\dbch\f0\loch\f0\fs20\par  I M\par  \par  74-year-old male with 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 Found to have + UA and in sepsis. Admitted for sepsis 2/2 UTI +/- viral URI vs CAP.  \par  \par  \plain\f1\fs20\mcpq5056\hich\f1\dbch\f1\loch\f1\cf2\fs20\ul{\field{\*\fldinst HYPERLINK [id number] }{\fldrslt Problem/Plan - 1:}}\plain\f0\fs20\wjtu9487\hich\f0\dbch\f0\loch\f0\fs20\ql\par  \'b7  {\*\bkmkstart lg22130752263}{\*\bkmkend cu25485864253}Problem: {\*\bkmkstart fj46237784105}{\*\bkmkend tt68103354861}Sepsis. \par  \'b7  {\*\bkmkstart ix67216210881}{\*\bkmkend rp56601922937}Plan: {\*\bkmkstart lg29424362725}{\*\bkmkend fi59653452884}Pt met SIRS criteria in the ED (Tmax 102F and , WBC 21.23). Lactate 1.3. CXR negative. RVP negative. CT angio PE negative but   +an ill-defined subpleural 7 mm GGO in the [facility] (series 4 image 89) with L basilar atelectasis.\par  UA + small LE, nitrite, 48 WBCs and pt reports 1-month history of foul smelling urine. Sepsis likely 2/2 UTI. S/p 2L of NS bolus, Azithro 500mg IV x1, CTX 2g IV x1, Vanco 1g IV x1 in ED. \par  - Give 700cc of LR bolus for fluid resus\par  - Follow up blood cultures\par  - Follow up urine culture\par  - Treat UTI as below \par  - Tylenol PRN temp >100.4F.\par  \par  \plain\f1\fs20\qrgc4730\hich\f1\dbch\f1\loch\f1\cf2\fs20\ul{\field{\*\fldinst HYPERLINK [id number] }{\fldrslt Problem/Plan - 2:}}\plain\f0\fs20\ilbn1246\hich\f0\dbch\f0\loch\f0\fs20\ql\par  \'b7  {\*\bkmkstart [id number]}{\*\bkmkend np95087364172}Problem: {\*\bkmkstart pg73782104885}{\*\bkmkend kt95345689498}Acute UTI. \par  \'b7  {\*\bkmkstart bi89677960686}{\*\bkmkend tj28784870221}Plan: {\*\bkmkstart vw17247557978}{\*\bkmkend xv65707295171}Pt febrile with 1-month history of foul smelling urine. Found to have + UA. S/p CTX 2g IV x1 in the ED. \par  - Follow up urine culture\par  - CTX 2g IV qd x7 days total (2/11- ).\par  \par  \plain\f1\fs20\qiap9843\hich\f1\dbch\f1\loch\f1\cf2\fs20\ul{\field{\*\fldinst HYPERLINK [id number] }{\fldrslt Problem/Plan - 3:}}\plain\f0\fs20\zanf2374\hich\f0\dbch\f0\loch\f0\fs20\ql\par  \'b7  {\*\bkmkstart py95511878155}{\*\bkmkend kd17819243362}Problem: {\*\bkmkstart yw28835604970}{\*\bkmkend br32947560175}Acute hypoxic respiratory failure. \par  \'b7  {\*\bkmkstart vq27126195285}{\*\bkmkend bq60321329839}Plan: {\*\bkmkstart tc26599634498}{\*\bkmkend fi97751631624}RESOLVED\par  Patient presenting with 2-day history  chills, body, productive cough with white sputum, nasal congestion, headache and 1-day history of worsening dyspnea. CXR clear. RVP negative. CT PE negative for PE but + an ill-defined subpleural 7 mm GGO in the   [facility] (series 4 image 89) with L basilar atelectasis. . Initially required 2L NC, now satting well on RA. There is a concern about CAP vs. viral PNA given symptoms, hypoxia and CT findings.\par  - Continue to monitor\par  - Obtain urine strep/legionella and sputum culture.\par  \par  \plain\f1\fs20\gdjj4409\hich\f1\dbch\f1\loch\f1\cf2\fs20\ul{\field{\*\fldinst HYPERLINK [id number] }{\fldrslt Problem/Plan - 4:}}\plain\f0\fs20\lvgt6912\hich\f0\dbch\f0\loch\f0\fs20\ql\par  \'b7  {\*\bkmkstart zr49338573818}{\*\bkmkend tl36105025743}Problem: {\*\bkmkstart pj12605614227}{\*\bkmkend mh52973969614}Normocytic anemia. \par  \'b7  {\*\bkmkstart ax31408251601}{\*\bkmkend ld32528683934}Plan: {\*\bkmkstart pk32100732549}{\*\bkmkend wx09648681957}Pt with normocytic anemia. Hb 11.7, MCV 89.2 (at baseline). Iron panel c/w iron deficiency anemia (5% sat and total iron 14). \par  - Maintain active T&S\par  - Treat iron deficiency outpatient once infection resolves.\par  \par  \plain\f1\fs20\qvmv0631\hich\f1\dbch\f1\loch\f1\cf2\fs20\ul{\field{\*\fldinst HYPERLINK [id number] }{\fldrslt Problem/Plan - 5:}}\plain\f0\fs20\wqpw6706\hich\f0\dbch\f0\loch\f0\fs20\ql\par  \'b7  {\*\bkmkstart xo03580580329}{\*\bkmkend hg82597581363}Problem: {\*\bkmkstart ii96557624423}{\*\bkmkend pu29435384139}History of hypotension. \par  \'b7  {\*\bkmkstart ni92414544996}{\*\bkmkend lj80427384486}Plan: {\*\bkmkstart id27803295210}{\*\bkmkend xy52209805933}Pt reports history of hypotension. On home Midodrine 10mg BID. BP currently normotensive.\par  - Hold home midodrine iso normotension \par  - Monitor BP.\par  \par  \plain\f1\fs20\ifgb3414\hich\f1\dbch\f1\loch\f1\cf2\fs20\ul{\field{\*\fldinst HYPERLINK [id number] }{\fldrslt Problem/Plan - 6:}}\plain\f0\fs20\repx9372\hich\f0\dbch\f0\loch\f0\fs20\ql\par  \'b7  {\*\bkmkstart zi10133678870}{\*\bkmkend pr21656798822}Problem: {\*\bkmkstart qa28376747310}{\*\bkmkend nl01104080993}Lower extremity edema. \par  \'b7  {\*\bkmkstart rr44304045182}{\*\bkmkend ov48560888737}Plan: {\*\bkmkstart sl75188561446}{\*\bkmkend ga18740556090}Pt with bilateral LE edema, takes Lasix 40mg qd. \par  - C/w Lasix 40mg qd\par  - Duplex US bilateral LE to r/o DVT (pt bedbound).\plain\f1\fs20\jufe5372\hich\f1\dbch\f1\loch\f1\cf2\fs20\strike\plain\f0\fs20\qlmt8635\hich\f0\dbch\f0\loch\f0\fs20\par  \par  \plain\f1\fs20\ixyt0687\hich\f1\dbch\f1\loch\f1\cf2\fs20\ul{\field{\*\fldinst HYPERLINK [id number] }{\fldrslt Problem/Plan - 7:}}\plain\f0\fs20\bvoi7180\hich\f0\dbch\f0\loch\f0\fs20\ql\par  \'b7  {\*\bkmkstart bs62349981256}{\*\bkmkend pq68349295546}Problem: {\*\bkmkstart nv87997080888}{\*\bkmkend jz80519964158}Hypomagnesemia. \par  \'b7  {\*\bkmkstart ng95361373676}{\*\bkmkend sz98059998786}Plan: {\*\bkmkstart eb57925656975}{\*\bkmkend vy51852970677}Pt with Mg 1.6. \par  - Mg 2g IV x1\par  - Follow up AM Mg.\par  \par  \plain\f1\fs20\aqbq7304\hich\f1\dbch\f1\loch\f1\cf2\fs20\ul{\field{\*\fldinst HYPERLINK [id number] }{\fldrslt Problem/Plan - 8:}}\plain\f0\fs20\vsfn6960\hich\f0\dbch\f0\loch\f0\fs20\ql\par  \'b7  {\*\bkmkstart dw56479265931}{\*\bkmkend nw25711203408}Problem: {\*\bkmkstart fv92700709028}{\*\bkmkend tm61500566780}History of cervical spine trauma. \par  \'b7  {\*\bkmkstart fg24370769295}{\*\bkmkend at09415250691}Plan: {\*\bkmkstart to22532420941}{\*\bkmkend hp21637708313}Pt with history of cervical spine injury 5 years ago, s/p spinal surgery (unknown by pt), since then has been bedbound, uses wheelchair   and has bilateral wrist contraction flexure. On home gabapentin 300mg qd and Tizanidine 4mg qAM and 2mg qhs.\par  - PT and OT consult\par  - C/w home med.\plain\f1\fs20\jhnf4022\hich\f1\dbch\f1\loch\f1\cf2\fs20\strike\plain\f0\fs20\viob4459\hich\f0\dbch\f0\loch\f0\fs20\par  \par  \plain\f1\fs20\hhmi5465\hich\f1\dbch\f1\loch\f1\cf2\fs20\ul{\field{\*\fldinst HYPERLINK [id number] }{\fldrslt Problem/Plan - 9:}}\plain\f0\fs20\bmel5062\hich\f0\dbch\f0\loch\f0\fs20\ql\par  \'b7  {\*\bkmkstart ck79935377611}{\*\bkmkend lx69404997452}Problem: {\*\bkmkstart xc68385448500}{\*\bkmkend fq93309746799}Glaucoma, both eyes. \par  \'b7  {\*\bkmkstart me50608642732}{\*\bkmkend yz91091096572}Plan: {\*\bkmkstart tx65028877743}{\*\bkmkend qf52068945428}Pt with bilateral glaucoma. On home Dorzolamide ophthalmic solution to both eyes, Prednisolone to L eye q2d, and Vyzulta 0.024% to   R eye qd.\par  - C/w home ophthalmic solutions.\par  \par  \plain\f1\fs20\vvtm8416\hich\f1\dbch\f1\loch\f1\cf2\fs20\ul{\field{\*\fldinst HYPERLINK [id number] }{\fldrslt Problem/Plan - 10:}}\plain\f0\fs20\rybj1008\hich\f0\dbch\f0\loch\f0\fs20\ql\par  \'b7  {\*\bkmkstart cr96518339231}{\*\bkmkend ud76927477522}Problem: {\*\bkmkstart qh69556242851}{\*\bkmkend mc92795535037}BPH (benign prostatic hyperplasia). \par  \'b7  {\*\bkmkstart xz82909991728}{\*\bkmkend ek99887358186}Plan; {\*\bkmkstart hd31695034253}{\*\bkmkend ex93198498009}Pt with BPH. On home Finasteride 5mg qd, Tamsulosin 0.8mg qhs.\par  - C/w home meds.\plain\f1\fs20\gbow6751\hich\f1\dbch\f1\loch\f1\cf2\fs20\strike\plain\f0\fs20\nbpu2726\hich\f0\dbch\f0\loch\f0\fs20\par  \par  \plain\f1\fs20\uwzr2879\hich\f1\dbch\f1\loch\f1\cf2\fs20\ul{\field{\*\fldinst HYPERLINK [id number] }{\fldrslt Problem/Plan - 11:}}\plain\f0\fs20\onqt1651\hich\f0\dbch\f0\loch\f0\fs20\ql\par  \'b7  {\*\bkmkstart jx586734596250}{\*\bkmkend jz877912904679}Problem: {\*\bkmkstart fm501482685607}{\*\bkmkend kr165191477327}Prophylactic measure. \par  \'b7  {\*\bkmkstart xn536558745532}{\*\bkmkend fs996155653148}Plan: {\*\bkmkstart ek437354289736}{\*\bkmkend cy655463730800}F: S/p 2L of NS bolus in ED\par  E: Replete as needed\par  D: Regular diet\par  DVT ppx: Lovenox\par  Dispo: RMF.\par  \par  }

## 2025-02-12 NOTE — PROGRESS NOTE ADULT - SUBJECTIVE AND OBJECTIVE BOX
*****INCOMPLETE NOTE*****    INTERVAL HPI/OVERNIGHT EVENTS:    SUBJECTIVE: Patient seen and examined at bedside, resting comfortably in bed, and does not appear to be in any acute distress. Patient reports    Vital Signs Last 24 Hrs  T(C): 37.2 (12 Feb 2025 05:36), Max: 38.8 (11 Feb 2025 20:00)  T(F): 99 (12 Feb 2025 05:36), Max: 101.9 (11 Feb 2025 20:00)  HR: 94 (12 Feb 2025 05:36) (92 - 116)  BP: 125/67 (12 Feb 2025 05:36) (105/60 - 159/78)  BP(mean): 105 (11 Feb 2025 19:35) (105 - 105)  RR: 18 (12 Feb 2025 05:36) (16 - 19)  SpO2: 94% (12 Feb 2025 05:36) (94% - 98%)    Parameters below as of 12 Feb 2025 05:36  Patient On (Oxygen Delivery Method): room air        PHYSICAL EXAM:  General: in no acute distress  Eyes: EOMI intact bilaterally. Anicteric sclerae, moist conjunctivae  HENT: Moist mucous membranes  Neck: Trachea midline, supple  Lungs: CTA B/L. No wheezes, rales, or rhonchi  Cardiovascular: RRR. No murmurs, rubs, or gallops  Abdomen: Soft, non-tender non-distended; No rebound or guarding  Extremities: WWP, No clubbing, cyanosis or edema  Neurological: Alert and oriented  Skin: Warm and dry. No obvious rash     LABS:                        10.7   22.37 )-----------( 168      ( 11 Feb 2025 08:30 )             34.9     02-11    135  |  100  |  12  ----------------------------<  134[H]  4.2   |  28  |  0.92    Ca    8.7      11 Feb 2025 05:07  Mg     1.6     02-11    TPro  8.2  /  Alb  3.6  /  TBili  1.0  /  DBili  x   /  AST  21  /  ALT  23  /  AlkPhos  95  02-11   *****INCOMPLETE NOTE*****    INTERVAL HPI/OVERNIGHT EVENTS: No acute events overnight.    SUBJECTIVE: Patient seen and examined at bedside, resting comfortably in bed, and does not appear to be in any acute distress. Patient reports feeling well and endorses an overall improvement in his nasal congestion, headache, and cough. Patient denies and fever, chills, sore throat, shortness of breath, chest pain, difficulty or pain urinating, nausea, vomiting, or diarrhea.     Vital Signs Last 24 Hrs  T(C): 37.2 (12 Feb 2025 05:36), Max: 38.8 (11 Feb 2025 20:00)  T(F): 99 (12 Feb 2025 05:36), Max: 101.9 (11 Feb 2025 20:00)  HR: 94 (12 Feb 2025 05:36) (92 - 116)  BP: 125/67 (12 Feb 2025 05:36) (105/60 - 159/78)  BP(mean): 105 (11 Feb 2025 19:35) (105 - 105)  RR: 18 (12 Feb 2025 05:36) (16 - 19)  SpO2: 94% (12 Feb 2025 05:36) (94% - 98%)    Parameters below as of 12 Feb 2025 05:36  Patient On (Oxygen Delivery Method): room air    PHYSICAL EXAM:  General: in no acute distress  Eyes: EOMI intact bilaterally. Anicteric sclerae, moist conjunctivae  HENT: Moist mucous membranes  Neck: Trachea midline, supple  Lungs: CTA B/L. No wheezes, rales, or rhonchi  Cardiovascular: RRR. No murmurs, rubs, or gallops  Abdomen: Soft, non-tender non-distended; No rebound or guarding  Extremities: 2+ b/l pitting edema, b/l wrist flexion contractures, WWP, No clubbing, cyanosis  Neurological: Alert and oriented  Skin: Warm and dry. No obvious rash     LABS:                        10.7   22.37 )-----------( 168      ( 11 Feb 2025 08:30 )             34.9     02-11    135  |  100  |  12  ----------------------------<  134[H]  4.2   |  28  |  0.92    Ca    8.7      11 Feb 2025 05:07  Mg     1.6     02-11    TPro  8.2  /  Alb  3.6  /  TBili  1.0  /  DBili  x   /  AST  21  /  ALT  23  /  AlkPhos  95  02-11   INTERVAL HPI/OVERNIGHT EVENTS: No acute events overnight.    SUBJECTIVE: Patient seen and examined at bedside, resting comfortably in bed, and does not appear to be in any acute distress. Patient reports feeling well and endorses an overall improvement in his nasal congestion, headache, and cough. Patient denies and fever, chills, sore throat, shortness of breath, chest pain, difficulty or pain urinating, nausea, vomiting, or diarrhea.     Vital Signs Last 24 Hrs  T(C): 37.2 (12 Feb 2025 05:36), Max: 38.8 (11 Feb 2025 20:00)  T(F): 99 (12 Feb 2025 05:36), Max: 101.9 (11 Feb 2025 20:00)  HR: 94 (12 Feb 2025 05:36) (92 - 116)  BP: 125/67 (12 Feb 2025 05:36) (105/60 - 159/78)  BP(mean): 105 (11 Feb 2025 19:35) (105 - 105)  RR: 18 (12 Feb 2025 05:36) (16 - 19)  SpO2: 94% (12 Feb 2025 05:36) (94% - 98%)    Parameters below as of 12 Feb 2025 05:36  Patient On (Oxygen Delivery Method): room air    PHYSICAL EXAM:  General: in no acute distress  Eyes: EOMI intact bilaterally. Anicteric sclerae, moist conjunctivae  HENT: Moist mucous membranes  Neck: Trachea midline, supple  Lungs: CTA B/L. No wheezes, rales, or rhonchi  Cardiovascular: RRR. No murmurs, rubs, or gallops  Abdomen: Soft, non-tender non-distended; No rebound or guarding  Extremities: 2+ b/l pitting edema, b/l wrist flexion contractures, WWP, No clubbing, cyanosis  Neurological: Alert and oriented  Skin: Warm and dry. No obvious rash     LABS:                        10.7   22.37 )-----------( 168      ( 11 Feb 2025 08:30 )             34.9     02-11    135  |  100  |  12  ----------------------------<  134[H]  4.2   |  28  |  0.92    Ca    8.7      11 Feb 2025 05:07  Mg     1.6     02-11    TPro  8.2  /  Alb  3.6  /  TBili  1.0  /  DBili  x   /  AST  21  /  ALT  23  /  AlkPhos  95  02-11

## 2025-02-12 NOTE — DISCHARGE NOTE PROVIDER - ATTENDING DISCHARGE PHYSICAL EXAMINATION:
General: in no acute distress  Eyes: EOMI intact bilaterally. Anicteric sclerae, moist conjunctivae  HENT: Moist mucous membranes  Neck: Trachea midline, supple  Lungs: CTA B/L. No wheezes, rales, or rhonchi  Cardiovascular: RRR. No murmurs, rubs, or gallops  Abdomen: Soft, non-tender non-distended; No rebound or guarding  Extremities: 2+ b/l pitting edema, b/l wrist flexion contractures, WWP, No clubbing, cyanosis  Neurological: Alert and oriented  Skin: Warm and dry. No obvious rash

## 2025-02-12 NOTE — DISCHARGE NOTE PROVIDER - NSDCCPCAREPLAN_GEN_ALL_CORE_FT
PRINCIPAL DISCHARGE DIAGNOSIS  Diagnosis: Sepsis  Assessment and Plan of Treatment: Sepsis is a serious illness that happens when an infection travels through the whole body. Sepsis can happen in anyone, but it is more likely to happen in people who: are older or bedridden, are staying in the hospital or have had recent surgery, have thin tubes such as catheters or IVs in their body, or have a weak infection-fighting system (for example because they are being treated for cancer).  Sepsis can come from an infection in any part of the body, but in your case, it was caused by an infection of the urine and lungs called UTI and pneumonia respetively. Treatment includes receiving intravenous fluids and antibiotics.   Please take the following medications:  Tab cefpodoxime 200mg twice a day till 2/17  Please follow up with your PCP in 7 days.       PRINCIPAL DISCHARGE DIAGNOSIS  Diagnosis: Sepsis  Assessment and Plan of Treatment: Sepsis is a serious illness that happens when an infection travels through the whole body. Sepsis can happen in anyone, but it is more likely to happen in people who: are older or bedridden, are staying in the hospital or have had recent surgery, have thin tubes such as catheters or IVs in their body, or have a weak infection-fighting system.  Sepsis can come from an infection in any part of the body, but in your case, it was caused by an infection of the urine and lungs called UTI (due to bacteria E.Coli) and pneumonia respetively. Treatment includes receiving intravenous fluids and antibiotics.   ------------------------------------------------------------------  Please take the following medications:  Tab cefpodoxime 200mg twice a day till 2/17  Please follow up with your PCP in 7 days.  Please follow up urine sensitivity report.

## 2025-02-12 NOTE — DISCHARGE NOTE PROVIDER - NSDCMRMEDTOKEN_GEN_ALL_CORE_FT
dorzolamide-timolol 2%-0.5% ophthalmic solution: 1 drop(s) to each affected eye 2 times a day  finasteride 5 mg oral tablet: 1 tab(s) orally once a day  furosemide 40 mg oral tablet: 1 tab(s) orally once a day  gabapentin 300 mg oral tablet: 1 tab(s) orally once a day  midodrine 10 mg oral tablet: 1 tab(s) orally 2 times a day  prednisoLONE acetate 0.12% ophthalmic suspension: 1 drop(s) to each affected eye 2 times a day (left eye)  tamsulosin 0.4 mg oral capsule: 2 cap(s) orally once a day  tiZANidine 2 mg oral tablet: 2 tab(s) orally qHS. 1 tab in the morning.  Vyzulta 0.024% ophthalmic solution: 1 drop(s) to each affected eye once a day (in the evening) to right eye   cefpodoxime 200 mg oral tablet: 1 tab(s) orally every 12 hours TAKE 1 TABLET TWICE A DAY FROM 2/14-2/17  dorzolamide-timolol 2%-0.5% ophthalmic solution: 1 drop(s) to each affected eye 2 times a day  Dulcolax Laxative 5 mg oral delayed release tablet: 1 tab(s) orally once a day  finasteride 5 mg oral tablet: 1 tab(s) orally once a day  furosemide 40 mg oral tablet: 1 tab(s) orally once a day  gabapentin 300 mg oral tablet: 1 tab(s) orally once a day  midodrine 10 mg oral tablet: 1 tab(s) orally 2 times a day  prednisoLONE acetate 0.12% ophthalmic suspension: 1 drop(s) to each affected eye 2 times a day (left eye)  tamsulosin 0.4 mg oral capsule: 2 cap(s) orally once a day  tiZANidine 2 mg oral tablet: 2 tab(s) orally qHS. 1 tab in the morning.  Vyzulta 0.024% ophthalmic solution: 1 drop(s) to each affected eye once a day (in the evening) to right eye

## 2025-02-12 NOTE — PROGRESS NOTE ADULT - PROBLEM SELECTOR PLAN 1
Pt met SIRS criteria in the ED (Tmax 102F and , WBC 21.23). Lactate 1.3. CXR negative. RVP negative. CT angio PE negative but +an ill-defined subpleural 7 mm GGO in the [facility] (series 4 image 89) with L basilar atelectasis.  UA + small LE, nitrite, 48 WBCs and pt reports 1-month history of foul smelling urine. Sepsis likely 2/2 UTI. S/p 2L of NS bolus, Azithro 500mg IV x1, CTX 2g IV x1, Vanco 1g IV x1 in ED.   - Give 700cc of LR bolus for fluid resus  - Follow up blood cultures  - Follow up urine culture  - Treat UTI as below   - Tylenol PRN temp >100.4F Pt met SIRS criteria in the ED (Tmax 102F and , WBC 21.23). Source- UTI (UA+ small LE, nitrite, 48 WBCs) + PNA (subpleural 7 mm GGO in the [facility]) Lactate 1.3. CXR negative. RVP negative.   S/p 2L of NS bolus, Azithro 500mg IV x1, CTX 2g IV x1, Vanco 1g IV x1 in ED.   - Give 700cc of LR bolus for fluid resus  - Follow up blood cultures  - Follow up urine culture  - Treat UTI as below   - Tylenol PRN temp >100.4F

## 2025-02-12 NOTE — PROGRESS NOTE ADULT - PROBLEM SELECTOR PLAN 2
Pt febrile with 1-month history of foul smelling urine. Found to have + UA. S/p CTX 2g IV x1 in the ED.   - Follow up urine culture  - CTX 2g IV qd x7 days total (2/11- ) Pt febrile with 1-month history of foul smelling urine. Found to have + UA. S/p CTX 2g IV x1 in the ED.   - Follow up urine culture  - CTX 2g IV qd x7 days total (2/11- 2/17)

## 2025-02-12 NOTE — DISCHARGE NOTE PROVIDER - HOSPITAL COURSE
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    Presented with 2-day history of chills, body aches, productive cough with white sputum and 1-month history of foul smelling urine found to have + UA and in sepsis 2/2 UTI.    Problem List/Main Diagnoses (system-based):   # 1 Problem: Sepsis  Plan: Pt met SIRS criteria in the ED (Tmax 102F and , WBC 21.23). Lactate 1.3. CXR negative. RVP negative. CT angio PE negative but +an ill-defined subpleural 7 mm GGO in the [facility] (series 4 image 89) with L basilar atelectasis.  UA + small LE, nitrite, 48 WBCs and pt reports 1-month history of foul smelling urine. Sepsis likely 2/2 UTI. S/p 2L of NS bolus, Azithro 500mg IV x1, CTX 2g IV x1, Vanco 1g IV x1 in ED. Patient has been afebrile since ___    # 2 Problem: Acute UTI  Plan: Pt febrile with 1-month history of foul smelling urine. Found to have + UA. S/p CTX 2g IV x1 in the ED. Continued treatment of Ceftriaxone 2g IV qd x 7 days total (2/11- __)    # 3 Problem: Acute Hypoxic Respiratory Failure  Plan: Resolved.   Patient presented with 2-day history  chills, body, productive cough with white sputum, nasal congestion, headache and 1-day history of worsening dyspnea. CXR clear. RVP negative. CT PE negative for PE but + an ill-defined subpleural 7 mm GGO in the [facility] (series 4 image 89) with L basilar atelectasis. . Initially required 2L NC, now satting well on RA.     # 4 Problem: Normocytic Anemia  Plan: Pt with normocytic anemia. Hb 11.7, MCV 89.2 (at baseline). Iron panel c/w iron deficiency anemia (5% sat and total iron 14).     #  5 Problem: History of Hypotension  Plan: Pt reports history of hypotension. On home Midodrine 10mg BID. BP currently normotensive.  Upon discharge continue Midodrine 10 mg twice a day.    # 6 Problem: Lower Extremity Edema  Plan: Continue Lasix 40mg daily    # 7 Problem: Hypomagnesemia  Plan: Pt with Mg 1.6, treated with Mg 2g IV x 1    # 8 Problem: History of Cervical Spine Trauma  Plan: Pt with history of cervical spine injury 5 years ago, s/p spinal surgery (unknown by pt), since then has been bedbound, uses wheelchair and has bilateral wrist contraction flexure. Continue gabapentin 300mg daily    #9 Problem: Glaucoma, both eyes  Plan: Continue Dorzolamide ophthalmic solution to both eyes, Prednisolone to L eye every other day, and Vyzulta 0.024% to R eye every day    #10 Problem: BPH (Benign Prostatic Hyperplasia)  Plan: Continue Finasteride 5mg daily, Tamsulosin 0.8mg at bedtime and Tizanidine 4mg every morning and 2mg at bedtime.          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 Patient is hemodynamically stable and is able to be discharged home.     Problem List/Main Diagnoses (system-based):   # 1 Problem: Sepsis  Plan: Pt met SIRS criteria in the ED (Tmax 102F and , WBC 21.23). Lactate 1.3. CXR negative. RVP negative. CT angio PE negative but +an ill-defined subpleural 7 mm GGO in the [facility] (series 4 image 89) with L basilar atelectasis.  UA + small LE, nitrite, 48 WBCs and pt reports 1-month history of foul smelling urine. Sepsis likely 2/2 UTI. S/p 2L of NS bolus, Azithro 500mg IV x1, CTX 2g IV x1, Vanco 1g IV x1 in ED. Patient has been treated with a 7 day course of Ceftriaxone 2g IV (2/11 - ___).     # 2 Problem: Acute UTI  Plan: Pt febrile with 1-month history of foul smelling urine. Found to have + UA. S/p CTX 2g IV x1 in the ED. Continued treatment of Ceftriaxone 2g IV qd x 7 days total (2/11- __)    # 3 Problem: Acute Hypoxic Respiratory Failure  Plan: Resolved.   Patient presented with 2-day history  chills, body, productive cough with white sputum, nasal congestion, headache and 1-day history of worsening dyspnea. CXR clear. RVP negative. CT PE negative for PE but + an ill-defined subpleural 7 mm GGO in the [facility] (series 4 image 89) with L basilar atelectasis. . Initially required 2L NC, now satting well on RA.     # 4 Problem: Normocytic Anemia  Plan: Pt with normocytic anemia. Hb 11.7, MCV 89.2 (at baseline). Iron panel c/w iron deficiency anemia (5% sat and total iron 14).     #  5 Problem: History of Hypotension  Plan: Pt reports history of hypotension. On home Midodrine 10mg BID. BP currently normotensive.  Upon discharge continue Midodrine 10 mg twice a day.    # 6 Problem: Lower Extremity Edema  Plan: Continue Lasix 40mg daily    # 7 Problem: Hypomagnesemia  Plan: Pt with Mg 1.6, treated with Mg 2g IV x 1    # 8 Problem: History of Cervical Spine Trauma  Plan: Pt with history of cervical spine injury 5 years ago, s/p spinal surgery (unknown by pt), since then has been bedbound, uses wheelchair and has bilateral wrist contraction flexure. Continue gabapentin 300mg daily    #9 Problem: Glaucoma, both eyes  Plan: Continue Dorzolamide ophthalmic solution to both eyes, Prednisolone to L eye every other day, and Vyzulta 0.024% to R eye every day    #10 Problem: BPH (Benign Prostatic Hyperplasia)  Plan: Continue Finasteride 5mg daily, Tamsulosin 0.8mg at bedtime and Tizanidine 4mg every morning and 2mg at bedtime.          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     Problem List/Main Diagnoses (system-based):   # 1 Problem: Sepsis  Plan: Pt met SIRS criteria in the ED (Tmax 102F and , WBC 21.23). Source- UTI (UA+ small LE, nitrite, 48 WBCs) + PNA (subpleural 7 mm GGO in the [facility]) Lactate 1.3. CXR negative. RVP negative.   S/p 2L of NS bolus, Azithro 500mg IV x1, CTX 2g IV x1, Vanco 1g IV x1 in ED.   Plan  - c/w Cefpodoxime 200mg bid till 2/17    # 2 Problem: Acute uncomplicated UTI  Plan: Pt febrile with 1-month history of foul smelling urine. Found to have + UA. S/p CTX 2g IV x1 in the ED. s/p Ceftriaxone 2g IV qd x 7 days total (2/11- 2/13)  Plan  - c/w Cefpodoxime 200mg bid till 2/17    # 3 Problem: Acute Hypoxic Respiratory Failure  Plan: Resolved.   Patient presented with 2-day history  chills, body, productive cough with white sputum, nasal congestion, headache and 1-day history of worsening dyspnea. CXR clear. RVP negative. CT PE negative for PE but + an ill-defined subpleural 7 mm GGO in the [facility] (series 4 image 89) with L basilar atelectasis. . Initially required 2L NC, now satting well on RA.     # 4 Problem: Normocytic Anemia  Plan: Pt with normocytic anemia. Hb 11.7, MCV 89.2 (at baseline). Iron panel c/w iron deficiency anemia (5% sat and total iron 14).     #  5 Problem: History of Hypotension  Plan: Pt reports history of hypotension. On home Midodrine 10mg BID. BP currently normotensive.  Upon discharge continue Midodrine 10 mg twice a day.    # 6 Problem: Lower Extremity Edema  Plan: Continue Lasix 40mg daily    # 7 Problem: Hypomagnesemia  Plan: Pt with Mg 1.6, treated with Mg 2g IV x 1    # 8 Problem: History of Cervical Spine Trauma  Plan: Pt with history of cervical spine injury 5 years ago, s/p spinal surgery (unknown by pt), since then has been bedbound, uses wheelchair and has bilateral wrist contraction flexure. Continue gabapentin 300mg daily    #9 Problem: Glaucoma, both eyes  Plan: Continue Dorzolamide ophthalmic solution to both eyes, Prednisolone to L eye every other day, and Vyzulta 0.024% to R eye every day    #10 Problem: BPH (Benign Prostatic Hyperplasia)  Plan: Continue Finasteride 5mg daily, Tamsulosin 0.8mg at bedtime and Tizanidine 4mg every morning and 2mg at bedtime.    New medications:  Cefpodoxime 200mg bid till 2/17  Labs to follow outpatient: urine culture      LABS & STUDIES:  SARS-CoV-2: NotDetec (11 Feb 2025 04:22)    LABS    PHYSICAL EXAM  General: in no acute distress  Eyes: EOMI intact bilaterally. Anicteric sclerae, moist conjunctivae  HENT: Moist mucous membranes  Neck: Trachea midline, supple  Lungs: CTA B/L. No wheezes, rales, or rhonchi  Cardiovascular: RRR. No murmurs, rubs, or gallops  Abdomen: Soft, non-tender non-distended; No rebound or guarding  Extremities: 2+ b/l pitting edema, b/l wrist flexion contractures, WWP, No clubbing, cyanosis  Neurological: Alert and oriented  Skin: Warm and dry. No obvious rash #Discharge: do not delete    WASHINGTON COREY is a 74y Male with a past medical history of cervical spine injury in 2018 s/p spinal surgery, bedbound with chronic upper extremity flexion contractures, BPH, b/l glaucoma, and hypotension (on midodrine), presented with 2-day history of chills, body aches, productive cough with white sputum and 1-month history of foul smelling urine found to meet SIRS criteria in the setting of pneumonia (CT scan showing GGO in [facility]) and UTI (+UA) treated with a 7 day course of Ceftriaxone 2g IV (2/11 -2/17), urine strep and legionella negative, clinically improving and hemodynamically stable for discharge to home.     Problem List/Main Diagnoses (system-based):   # 1 Problem: Sepsis  Plan: Pt met SIRS criteria in the ED (Tmax 102F and , WBC 21.23). Source- UTI (UA+ small LE, nitrite, 48 WBCs, UCx- E.Coli+) + PNA (subpleural 7 mm GGO in the [facility]) Lactate 1.3. CXR negative. RVP negative.   S/p 2L of NS bolus, Azithro 500mg IV x1, CTX 2g IV x1, Vanco 1g IV x1 in ED.   Plan  - c/w Cefpodoxime 200mg bid till 2/17    # 2 Problem: Acute uncomplicated UTI  Plan: Pt febrile with 1-month history of foul smelling urine. Found to have + UA. S/p CTX 2g IV x1 in the ED. s/p Ceftriaxone 2g IV qd x 7 days total (2/11- 2/13)  Plan  - c/w Cefpodoxime 200mg bid till 2/17    # 3 Problem: Acute Hypoxic Respiratory Failure  Plan: Resolved.   Patient presented with 2-day history  chills, body, productive cough with white sputum, nasal congestion, headache and 1-day history of worsening dyspnea. CXR clear. RVP negative. CT PE negative for PE but + an ill-defined subpleural 7 mm GGO in the [facility] (series 4 image 89) with L basilar atelectasis. . Initially required 2L NC, now satting well on RA.     # 4 Problem: Normocytic Anemia  Plan: Pt with normocytic anemia. Hb 11.7, MCV 89.2 (at baseline). Iron panel c/w iron deficiency anemia (5% sat and total iron 14).     #  5 Problem: History of Hypotension  Plan: Pt reports history of hypotension. On home Midodrine 10mg BID. BP currently normotensive.  Upon discharge continue Midodrine 10 mg twice a day.    # 6 Problem: Lower Extremity Edema  Plan: Continue Lasix 40mg daily    # 7 Problem: Hypomagnesemia  Plan: Pt with Mg 1.6, treated with Mg 2g IV x 1    # 8 Problem: History of Cervical Spine Trauma  Plan: Pt with history of cervical spine injury 5 years ago, s/p spinal surgery (unknown by pt), since then has been bedbound, uses wheelchair and has bilateral wrist contraction flexure. Continue gabapentin 300mg daily    #9 Problem: Glaucoma, both eyes  Plan: Continue Dorzolamide ophthalmic solution to both eyes, Prednisolone to L eye every other day, and Vyzulta 0.024% to R eye every day    #10 Problem: BPH (Benign Prostatic Hyperplasia)  Plan: Continue Finasteride 5mg daily, Tamsulosin 0.8mg at bedtime and Tizanidine 4mg every morning and 2mg at bedtime.    New medications:  Cefpodoxime 200mg bid till 2/17  Labs to follow outpatient: urine sensitivity      LABS & STUDIES:  SARS-CoV-2: NotDetec (11 Feb 2025 04:22)    LABS    PHYSICAL EXAM  General: in no acute distress  Eyes: EOMI intact bilaterally. Anicteric sclerae, moist conjunctivae  HENT: Moist mucous membranes  Neck: Trachea midline, supple  Lungs: CTA B/L. No wheezes, rales, or rhonchi  Cardiovascular: RRR. No murmurs, rubs, or gallops  Abdomen: Soft, non-tender non-distended; No rebound or guarding  Extremities: 2+ b/l pitting edema, b/l wrist flexion contractures, WWP, No clubbing, cyanosis  Neurological: Alert and oriented  Skin: Warm and dry. No obvious rash

## 2025-02-12 NOTE — PROGRESS NOTE ADULT - PROBLEM SELECTOR PLAN 11
F: S/p 2L of NS bolus in ED  E: Replete as needed  D: Regular diet  DVT ppx: Lovenox  Dispo: F Pt with BPH. On home Finasteride 5mg qd, Tamsulosin 0.8mg qhs and Tizanidine 4mg qAM and 2mg qhs.  - C/w home meds

## 2025-02-12 NOTE — PROGRESS NOTE ADULT - PROBLEM SELECTOR PLAN 9
Pt with bilateral glaucoma. On home Dorzolamide ophthalmic solution to both eyes, Prednisolone to L eye q2d, and Vyzulta 0.024% to R eye qd.  - C/w home ophthalmic solutions RSOLVED  Trops: 111.7>42>33  - iso demand ischemia

## 2025-02-12 NOTE — CONSULT NOTE ADULT - SUBJECTIVE AND OBJECTIVE BOX
Patient is a 74y old  Male who presents with a chief complaint of sepsis 2/2 UTI (12 Feb 2025 07:34)      HPI:  74-year-old male with hx of cervical spine injury in 2018 s/p spinal surgery, bedbound with chronic upper extremity flexion contractures, BPH, b/l glaucoma, hypotension (on midodrine); presenting with 2-day history of chills, body aches, productive cough with white sputum, nasal congestion, headache. Reports associated worsening dyspnea since last night. ROS: +1 episode of loose stools; + foul smelling urine for the past month. Denies any chest pain, sore throat, abdominal pain, n/v, dysuria, hematuria.     ED course:   Initial vital signs: T99.6F (max 102F),  -> 110s, /64 -> 133/69, RR 18, SpO2 96% on 2L NC -> 95% on RA  Labs significant for: WBC 21.23 (neut predominant), Hb 11.7, MCV 89.2, INR 1.28, Troponin I 75 -> 111, Pro-, Mg 1.6, lactate 1.3, full RVP negative   UA: cloudy, spec gravity 1.085, trace protein, small blood, small LE, + nitrite, 48 WBC, 34 RBC, many bacteria    EKG: Sinus tachycardia at 123bpm, QT/QTc 300/429  CT angio PE: no PE. + an ill-defined subpleural 7 mm GGO in the [facility] (series 4 image 89) with L basilar atelectasis.  CXR: clear lungs.  Medications: tylenol 975mg PO x1, Azithro 500mg IV x1, CTX 2g IV x1, Vanco 1g IV x1, 2L NS bolus x1  Consults: none (11 Feb 2025 22:59)    PAST MEDICAL & SURGICAL HISTORY:  BPH without urinary obstruction      Glaucoma      H/O cervical spine surgery      H/O hypotension        MEDICATIONS  (STANDING):  cefTRIAXone   IVPB 2000 milliGRAM(s) IV Intermittent every 24 hours  dorzolamide 2%/timolol 0.5% Ophthalmic Solution 1 Drop(s) Both EYES every 12 hours  enoxaparin Injectable 40 milliGRAM(s) SubCutaneous every 24 hours  finasteride 5 milliGRAM(s) Oral every 24 hours  gabapentin 300 milliGRAM(s) Oral every 24 hours  influenza  Vaccine (HIGH DOSE) 0.5 milliLiter(s) IntraMuscular once  prednisoLONE acetate 1% Suspension 1 Drop(s) Left EYE <User Schedule>  tamsulosin 0.8 milliGRAM(s) Oral at bedtime  tiZANidine 4 milliGRAM(s) Oral every 24 hours  tiZANidine 2 milliGRAM(s) Oral at bedtime    MEDICATIONS  (PRN):  melatonin 3 milliGRAM(s) Oral at bedtime PRN Insomnia    FAMILY HISTORY:      CBC Full  -  ( 11 Feb 2025 08:30 )  WBC Count : 22.37 K/uL  RBC Count : 3.84 M/uL  Hemoglobin : 10.7 g/dL  Hematocrit : 34.9 %  Platelet Count - Automated : 168 K/uL  Mean Cell Volume : 90.9 fl  Mean Cell Hemoglobin : 27.9 pg  Mean Cell Hemoglobin Concentration : 30.7 g/dL  Auto Neutrophil # : x  Auto Lymphocyte # : x  Auto Monocyte # : x  Auto Eosinophil # : x  Auto Basophil # : x  Auto Neutrophil % : x  Auto Lymphocyte % : x  Auto Monocyte % : x  Auto Eosinophil % : x  Auto Basophil % : x      02-11    135  |  100  |  12  ----------------------------<  134[H]  4.2   |  28  |  0.92    Ca    8.7      11 Feb 2025 05:07  Mg     1.6     02-11    TPro  8.2  /  Alb  3.6  /  TBili  1.0  /  DBili  x   /  AST  21  /  ALT  23  /  AlkPhos  95  02-11      Urinalysis Basic - ( 11 Feb 2025 05:07 )    Color: x / Appearance: x / SG: x / pH: x  Gluc: 134 mg/dL / Ketone: x  / Bili: x / Urobili: x   Blood: x / Protein: x / Nitrite: x   Leuk Esterase: x / RBC: x / WBC x   Sq Epi: x / Non Sq Epi: x / Bacteria: x        Radiology :     < from: CT Angio Chest PE Protocol w/ IV Cont (02.11.25 @ 06:27) >  ACC: [id number] EXAM:  CT ANGIO CHEST PULM ART WAWIC   ORDERED BY: TEVIN MCKINNEY     PROCEDURE DATE:  02/11/2025          INTERPRETATION:  CLINICAL INFORMATION: Febrile tachycardia. Evaluate for   PE.    COMPARISON: Chest radiograph 12/16/2023. CT cervical spine 1/15/2023..    CONTRAST/COMPLICATIONS:  IV Contrast: Omnipaque 350  96 cc administered   4 cc discarded  Oral Contrast: NONE  .    PROCEDURE:  CT Angiography of the Chest.  Sagittal and coronal reformats were performed as well as 3D (MIP)   reconstructions.    FINDINGS:    LUNGS AND LARGE AIRWAYS: Patent central airways. No pulmonary mass or   consolidation. There is an ill-defined subpleural 7 mm groundglass   opacity in the left upper lobe (series 4 image 89). Left basilar   atelectasis.    PLEURA: No pleural effusion or pneumothorax.    VESSELS: No central filling defects identified in the pulmonary arteries   down to the segmental level. Streak artifact, particularly in the lower   lobes, and suboptimal opacification limits evaluation of the subsegmental   pulmonary arteries. Borderline dilated main pulmonary artery measuring   3.1 cm diameter. Normal caliber thoracic aorta.    HEART: Heart size is normal. No pericardial effusion.    MEDIASTINUM AND CHILANGO: No lymphadenopathy. Few coarse mediastinal   calcifications (subcarinal and paraesophageal), possibly small calcified   nodes.    CHEST WALL AND LOWER NECK: Within normal limits.    VISUALIZED UPPER ABDOMEN: Persistent elevation of left hemidiaphragm.   Small cyst left hepatic lobe.    BONES: Degenerative changes of the spine.      IMPRESSION:  No pulmonary embolism down to the segmental level.    No evidence of infectious process in the lungs.    Borderline dilated main pulmonary artery, as can be seen in setting of   pulmonary hypertension.          Review of Systems : per HPI         Vital Signs Last 24 Hrs  T(C): 37.2 (12 Feb 2025 05:36), Max: 38.8 (11 Feb 2025 20:00)  T(F): 99 (12 Feb 2025 05:36), Max: 101.9 (11 Feb 2025 20:00)  HR: 94 (12 Feb 2025 05:36) (92 - 116)  BP: 125/67 (12 Feb 2025 05:36) (105/60 - 159/78)  BP(mean): 105 (11 Feb 2025 19:35) (105 - 105)  RR: 18 (12 Feb 2025 05:36) (16 - 19)  SpO2: 94% (12 Feb 2025 05:36) (94% - 98%)    Parameters below as of 12 Feb 2025 05:36  Patient On (Oxygen Delivery Method): room air            Physical Exam:   74 y o man lying comfortably in semi Nunn's position , awake , alert , no acute complaints     Head: normocephalic , atraumatic    Eyes: PERRLA , EOMI , no nystagmus , sclera anicteric    ENT / FACE: neg nasal discharge , uvula midline , no oropharyngeal erythema / exudate    Neck: supple , negative JVD , negative carotid bruits , no thyromegaly    Chest: CTA bilaterally     Cardiovascular: regular rate and rhythm , neg murmurs / rubs / gallops    Abdomen: soft , non distended , no tenderness to palpation in all 4 quadrants ,  normal bowel sounds     Extremities:  edema , negative calf tenderness to palpation , negative Rodolfo's sign    Musculoskeletal:   B UE wrist flex ctx      Neurologic Exam:     Alert and oriented to person , place , date/year , speech fluent w/o dysarthria   Cranial Nerves:           II:                         pupils equal , round and reactive to light , visual fields intact         III/ IV/VI:             extraocular movements intact , neg nystagmus , neg ptosis        V:                        facial sensation intact , V1-3 normal        VII:                      face symmetric , no droop , normal eye closure and smile        VIII:                     hearing intact to finger rub bilaterally        IX and X:             no hoarseness , gag intact , palate/ uvula rise symmetrically        XI:                       SCM / trapezius strength intact bilateral        XII:                      no tongue deviation    Motor Exam:        > 3+/5 x 4 extremities ,  hands/wrists ctx limited     Sensation:         intact to light touch x 4 extremities                                DTR:           biceps/brachioradialis: equal                            patella/ankle: equal       Gait:  not tested         PM&R Impression: admitted for sepsis 2/2 UTI +/- viral URI vs CAP     - deconditioned       Recommendations / Plan:       1) Physical / Occupational therapy focusing on therapeutic exercises , equipment evaluation , bed mobility/transfer out of bed evaluation , progressive ambulation with assistive devices prn .    2) Current disposition plan recommendation:    pending functional progress , patient is bedbound

## 2025-02-13 ENCOUNTER — TRANSCRIPTION ENCOUNTER (OUTPATIENT)
Age: 75
End: 2025-02-13

## 2025-02-13 VITALS
TEMPERATURE: 98 F | DIASTOLIC BLOOD PRESSURE: 81 MMHG | OXYGEN SATURATION: 96 % | HEART RATE: 76 BPM | RESPIRATION RATE: 18 BRPM | SYSTOLIC BLOOD PRESSURE: 141 MMHG

## 2025-02-13 LAB
BASOPHILS # BLD AUTO: 0.03 K/UL — SIGNIFICANT CHANGE UP (ref 0–0.2)
BASOPHILS NFR BLD AUTO: 0.2 % — SIGNIFICANT CHANGE UP (ref 0–2)
EOSINOPHIL # BLD AUTO: 0.15 K/UL — SIGNIFICANT CHANGE UP (ref 0–0.5)
EOSINOPHIL NFR BLD AUTO: 1.2 % — SIGNIFICANT CHANGE UP (ref 0–6)
HCT VFR BLD CALC: 33 % — LOW (ref 39–50)
HGB BLD-MCNC: 10.3 G/DL — LOW (ref 13–17)
IMM GRANULOCYTES NFR BLD AUTO: 0.5 % — SIGNIFICANT CHANGE UP (ref 0–0.9)
LYMPHOCYTES # BLD AUTO: 1.78 K/UL — SIGNIFICANT CHANGE UP (ref 1–3.3)
LYMPHOCYTES # BLD AUTO: 14.7 % — SIGNIFICANT CHANGE UP (ref 13–44)
MCHC RBC-ENTMCNC: 27.9 PG — SIGNIFICANT CHANGE UP (ref 27–34)
MCHC RBC-ENTMCNC: 31.2 G/DL — LOW (ref 32–36)
MCV RBC AUTO: 89.4 FL — SIGNIFICANT CHANGE UP (ref 80–100)
MONOCYTES # BLD AUTO: 1.14 K/UL — HIGH (ref 0–0.9)
MONOCYTES NFR BLD AUTO: 9.4 % — SIGNIFICANT CHANGE UP (ref 2–14)
NEUTROPHILS # BLD AUTO: 8.98 K/UL — HIGH (ref 1.8–7.4)
NEUTROPHILS NFR BLD AUTO: 74 % — SIGNIFICANT CHANGE UP (ref 43–77)
NRBC BLD AUTO-RTO: 0 /100 WBCS — SIGNIFICANT CHANGE UP (ref 0–0)
PLATELET # BLD AUTO: 162 K/UL — SIGNIFICANT CHANGE UP (ref 150–400)
RBC # BLD: 3.69 M/UL — LOW (ref 4.2–5.8)
RBC # FLD: 14.7 % — HIGH (ref 10.3–14.5)
WBC # BLD: 12.14 K/UL — HIGH (ref 3.8–10.5)
WBC # FLD AUTO: 12.14 K/UL — HIGH (ref 3.8–10.5)

## 2025-02-13 PROCEDURE — 82803 BLOOD GASES ANY COMBINATION: CPT

## 2025-02-13 PROCEDURE — 84145 PROCALCITONIN (PCT): CPT

## 2025-02-13 PROCEDURE — 99239 HOSP IP/OBS DSCHRG MGMT >30: CPT

## 2025-02-13 PROCEDURE — 83540 ASSAY OF IRON: CPT

## 2025-02-13 PROCEDURE — 83605 ASSAY OF LACTIC ACID: CPT

## 2025-02-13 PROCEDURE — 87086 URINE CULTURE/COLONY COUNT: CPT

## 2025-02-13 PROCEDURE — 99291 CRITICAL CARE FIRST HOUR: CPT | Mod: 25

## 2025-02-13 PROCEDURE — 83880 ASSAY OF NATRIURETIC PEPTIDE: CPT

## 2025-02-13 PROCEDURE — 83735 ASSAY OF MAGNESIUM: CPT

## 2025-02-13 PROCEDURE — 87637 SARSCOV2&INF A&B&RSV AMP PRB: CPT

## 2025-02-13 PROCEDURE — 96375 TX/PRO/DX INJ NEW DRUG ADDON: CPT

## 2025-02-13 PROCEDURE — 96374 THER/PROPH/DIAG INJ IV PUSH: CPT

## 2025-02-13 PROCEDURE — 85730 THROMBOPLASTIN TIME PARTIAL: CPT

## 2025-02-13 PROCEDURE — 93005 ELECTROCARDIOGRAM TRACING: CPT

## 2025-02-13 PROCEDURE — 84484 ASSAY OF TROPONIN QUANT: CPT

## 2025-02-13 PROCEDURE — 71045 X-RAY EXAM CHEST 1 VIEW: CPT

## 2025-02-13 PROCEDURE — 86900 BLOOD TYPING SEROLOGIC ABO: CPT

## 2025-02-13 PROCEDURE — 80053 COMPREHEN METABOLIC PANEL: CPT

## 2025-02-13 PROCEDURE — 82728 ASSAY OF FERRITIN: CPT

## 2025-02-13 PROCEDURE — 0225U NFCT DS DNA&RNA 21 SARSCOV2: CPT

## 2025-02-13 PROCEDURE — 87899 AGENT NOS ASSAY W/OPTIC: CPT

## 2025-02-13 PROCEDURE — 87186 SC STD MICRODIL/AGAR DIL: CPT

## 2025-02-13 PROCEDURE — 86901 BLOOD TYPING SEROLOGIC RH(D): CPT

## 2025-02-13 PROCEDURE — 71275 CT ANGIOGRAPHY CHEST: CPT | Mod: MC

## 2025-02-13 PROCEDURE — 83550 IRON BINDING TEST: CPT

## 2025-02-13 PROCEDURE — 81001 URINALYSIS AUTO W/SCOPE: CPT

## 2025-02-13 PROCEDURE — 85610 PROTHROMBIN TIME: CPT

## 2025-02-13 PROCEDURE — 93970 EXTREMITY STUDY: CPT

## 2025-02-13 PROCEDURE — 80048 BASIC METABOLIC PNL TOTAL CA: CPT

## 2025-02-13 PROCEDURE — 36415 COLL VENOUS BLD VENIPUNCTURE: CPT

## 2025-02-13 PROCEDURE — G0378: CPT

## 2025-02-13 PROCEDURE — 84100 ASSAY OF PHOSPHORUS: CPT

## 2025-02-13 PROCEDURE — 86850 RBC ANTIBODY SCREEN: CPT

## 2025-02-13 PROCEDURE — 87040 BLOOD CULTURE FOR BACTERIA: CPT

## 2025-02-13 PROCEDURE — 85025 COMPLETE CBC W/AUTO DIFF WBC: CPT

## 2025-02-13 RX ADMIN — Medication 5 MILLIGRAM(S): at 06:07

## 2025-02-13 RX ADMIN — GABAPENTIN 300 MILLIGRAM(S): 800 TABLET ORAL at 06:06

## 2025-02-13 RX ADMIN — CEFTRIAXONE 100 MILLIGRAM(S): 250 INJECTION, POWDER, FOR SOLUTION INTRAMUSCULAR; INTRAVENOUS at 10:10

## 2025-02-13 RX ADMIN — Medication 4 MILLIGRAM(S): at 06:06

## 2025-02-13 RX ADMIN — DORZOLAMIDE HYDROCHLORIDE AND TIMOLOL MALEATE OPHTHALMIC SOLUTION 1 DROP(S): 20; 5 SOLUTION OPHTHALMIC at 10:10

## 2025-02-13 RX ADMIN — ACETAMINOPHEN, DIPHENHYDRAMINE HCL, PHENYLEPHRINE HCL 3 MILLIGRAM(S): 325; 25; 5 TABLET ORAL at 02:43

## 2025-02-13 RX ADMIN — ENOXAPARIN SODIUM 40 MILLIGRAM(S): 100 INJECTION SUBCUTANEOUS at 06:07

## 2025-02-13 NOTE — DISCHARGE NOTE NURSING/CASE MANAGEMENT/SOCIAL WORK - FINANCIAL ASSISTANCE
[facility] provides services at a reduced cost to those who are determined to be eligible through [facility]’s financial assistance program. Information regarding [facility]’s financial assistance program can be found by going to [email or web address] or by calling [phone].

## 2025-02-13 NOTE — PROGRESS NOTE ADULT - ATTENDING COMMENTS
74-year-old male with a PMHx of cervical spine injury (s/p surgical intervention, now c/b functional quadriplegia with upper extremity flexion contractures), BPH, hypotension (on Midodrine) and glaucoma who presented with sepsis 2/2 UTI vs. viral URI.      Plan:    -continue with empiric CTX, follow up BCx and UCX, monitor fever curve  -follow up DVT US    Rest of plan as per resident note.
74-year-old male with a PMHx of cervical spine injury (s/p surgical intervention, now c/b functional quadriplegia with upper extremity flexion contractures), BPH, hypotension (on Midodrine) and glaucoma who presented with sepsis 2/2 UTI vs. viral URI.  UCx (2/11): E. coli.  BCx (2/11): NGTD.  Treated with CTX while hospitalized with plan to transition to Cefpodoxime upon discharge to complete 7-day course.  SW consult appreciated.

## 2025-02-13 NOTE — PROGRESS NOTE ADULT - PROBLEM SELECTOR PLAN 6
Pt with bilateral LE edema, takes Lasix 40mg qd.   - C/w Lasix 40mg qd
Pt with bilateral LE edema, takes Lasix 40mg qd.   - C/w Lasix 40mg qd

## 2025-02-13 NOTE — PROGRESS NOTE ADULT - PROBLEM SELECTOR PLAN 8
Pt with history of cervical spine injury 5 years ago, s/p spinal surgery (unknown by pt), since then has been bedbound, uses wheelchair and has bilateral wrist contraction flexure. On home gabapentin 300mg qd.  - PT and OT consult  - C/w home med
Pt with history of cervical spine injury 5 years ago, s/p spinal surgery (unknown by pt), since then has been bedbound, uses wheelchair and has bilateral wrist contraction flexure. On home gabapentin 300mg qd.  - PT and OT consult  - C/w home med

## 2025-02-13 NOTE — PROGRESS NOTE ADULT - PROBLEM SELECTOR PLAN 12
F: S/p 2L of NS bolus in ED  E: Replete as needed  D: Regular diet  DVT ppx: Lovenox  Dispo: F
F: S/p 2L of NS bolus in ED  E: Replete as needed  D: Regular diet  DVT ppx: Lovenox  Dispo: F

## 2025-02-13 NOTE — PROGRESS NOTE ADULT - SUBJECTIVE AND OBJECTIVE BOX
*****INCOMPLETE NOTE*****    INTERVAL HPI/OVERNIGHT EVENTS:    SUBJECTIVE: Patient seen and examined at bedside, resting comfortably in bed, and does not appear to be in any acute distress. Patient reports    Vital Signs Last 24 Hrs  T(C): 36.9 (13 Feb 2025 06:20), Max: 37.2 (12 Feb 2025 20:14)  T(F): 98.5 (13 Feb 2025 06:20), Max: 98.9 (12 Feb 2025 20:14)  HR: 96 (13 Feb 2025 06:20) (85 - 96)  BP: 157/83 (13 Feb 2025 06:20) (118/65 - 157/83)  BP(mean): --  RR: 19 (13 Feb 2025 06:20) (16 - 19)  SpO2: 96% (13 Feb 2025 06:20) (96% - 97%)    Parameters below as of 13 Feb 2025 06:20  Patient On (Oxygen Delivery Method): room air        PHYSICAL EXAM:  General: in no acute distress  Eyes: EOMI intact bilaterally. Anicteric sclerae, moist conjunctivae  HENT: Moist mucous membranes  Neck: Trachea midline, supple  Lungs: CTA B/L. No wheezes, rales, or rhonchi  Cardiovascular: RRR. No murmurs, rubs, or gallops  Abdomen: Soft, non-tender non-distended; No rebound or guarding  Extremities: WWP, No clubbing, cyanosis or edema  Neurological: Alert and oriented  Skin: Warm and dry. No obvious rash     LABS:                        10.4   15.53 )-----------( 160      ( 12 Feb 2025 05:30 )             35.2     02-12    135  |  102  |  7   ----------------------------<  98  3.8   |  25  |  0.67    Ca    8.5      12 Feb 2025 05:30  Phos  2.4     02-12  Mg     2.0     02-12     *****INCOMPLETE NOTE*****    INTERVAL HPI/OVERNIGHT EVENTS: No acute events overnight.    SUBJECTIVE: Patient seen and examined at bedside, resting comfortably in bed, and does not appear to be in any acute distress. Patient reports an improvement in cough and nasal congestion. Patient endorses mild shortness of breath when eating, but states that this is not a new complaint. Patient denies any fever/chills, sore throat, nasal congestion, dysuria, suprapubic pain, abdominal pain, nausea, vomiting, diarrhea, or chest pain.     Vital Signs Last 24 Hrs  T(C): 36.9 (13 Feb 2025 06:20), Max: 37.2 (12 Feb 2025 20:14)  T(F): 98.5 (13 Feb 2025 06:20), Max: 98.9 (12 Feb 2025 20:14)  HR: 96 (13 Feb 2025 06:20) (85 - 96)  BP: 157/83 (13 Feb 2025 06:20) (118/65 - 157/83)  BP(mean): --  RR: 19 (13 Feb 2025 06:20) (16 - 19)  SpO2: 96% (13 Feb 2025 06:20) (96% - 97%)    Parameters below as of 13 Feb 2025 06:20  Patient On (Oxygen Delivery Method): room air        PHYSICAL EXAM:  General: in no acute distress  Eyes: EOMI intact bilaterally. Anicteric sclerae, moist conjunctivae  HENT: Moist mucous membranes  Neck: Trachea midline, supple  Lungs: CTA B/L. No wheezes, rales, or rhonchi  Cardiovascular: RRR. No murmurs, rubs, or gallops  Abdomen: Soft, non-tender non-distended; No rebound or guarding  Extremities :2+ pitting edema of lower extremities, b/l wrist flexion contractures, WWP, No clubbing, cyanosis or edema  Neurological: Alert and oriented  Skin: Warm and dry. No obvious rash     LABS:                        10.4   15.53 )-----------( 160      ( 12 Feb 2025 05:30 )             35.2     02-12    135  |  102  |  7   ----------------------------<  98  3.8   |  25  |  0.67    Ca    8.5      12 Feb 2025 05:30  Phos  2.4     02-12  Mg     2.0     02-12     *****INCOMPLETE NOTE*****    INTERVAL HPI/OVERNIGHT EVENTS: No acute events overnight.    SUBJECTIVE: Patient seen and examined at bedside, resting comfortably in bed, and does not appear to be in any acute distress. Patient reports an improvement in cough and nasal congestion. Patient endorses mild shortness of breath when eating, but states that this is not a new complaint. Patient denies any fever/chills, sore throat, nasal congestion, dysuria, suprapubic pain, abdominal pain, nausea, vomiting, diarrhea, or chest pain.     Vital Signs Last 24 Hrs  T(C): 36.9 (13 Feb 2025 06:20), Max: 37.2 (12 Feb 2025 20:14)  T(F): 98.5 (13 Feb 2025 06:20), Max: 98.9 (12 Feb 2025 20:14)  HR: 96 (13 Feb 2025 06:20) (85 - 96)  BP: 157/83 (13 Feb 2025 06:20) (118/65 - 157/83)  BP(mean): --  RR: 19 (13 Feb 2025 06:20) (16 - 19)  SpO2: 96% (13 Feb 2025 06:20) (96% - 97%)    Parameters below as of 13 Feb 2025 06:20  Patient On (Oxygen Delivery Method): room air        PHYSICAL EXAM:  General: in no acute distress  Eyes: EOMI intact bilaterally. Anicteric sclerae, moist conjunctivae  HENT: Moist mucous membranes  Neck: Trachea midline, supple  Lungs: CTA B/L. No wheezes, rales, or rhonchi  Cardiovascular: RRR. No murmurs, rubs, or gallops  Abdomen: Soft, non-tender non-distended; No rebound or guarding  Extremities :2+ pitting edema of lower extremities, b/l wrist flexion contractures, WWP, No clubbing, cyanosis or edema  Neurological: Alert and oriented  Skin: Warm and dry. No obvious rash     LABS:                        10.4   15.53 )-----------( 160      ( 12 Feb 2025 05:30 )             35.2     02-12    135  |  102  |  7   ----------------------------<  98  3.8   |  25  |  0.67    Ca    8.5      12 Feb 2025 05:30  Phos  2.4     02-12  Mg     2.0     02-12    URINE CULTURE:  +E.coli

## 2025-02-13 NOTE — DISCHARGE NOTE NURSING/CASE MANAGEMENT/SOCIAL WORK - NSDCPEFALRISK_GEN_ALL_CORE
For information on Fall & Injury Prevention, visit: [email or web address] OR  [email or web address] OR  https://www.cdc.gov/steadi/patient.html

## 2025-02-13 NOTE — PROGRESS NOTE ADULT - PROBLEM SELECTOR PLAN 5
Pt reports history of hypotension. On home Midodrine 10mg BID. BP currently normotensive.  - Hold home midodrine iso normotension   - Monitor BP
Pt reports history of hypotension. On home Midodrine 10mg BID. BP currently normotensive.  - Hold home midodrine iso normotension   - Monitor BP

## 2025-02-13 NOTE — DISCHARGE NOTE NURSING/CASE MANAGEMENT/SOCIAL WORK - PATIENT PORTAL LINK FT
You can access the FollowMyHealth Patient Portal offered by [facility] by registering at the following website: [email or web address]. By joining [facility]’s FollowMyHealth portal, you will also be able to view your health information using other applications (apps) compatible with our system.

## 2025-02-13 NOTE — PROGRESS NOTE ADULT - PROBLEM SELECTOR PLAN 11
Pt with BPH. On home Finasteride 5mg qd, Tamsulosin 0.8mg qhs and Tizanidine 4mg qAM and 2mg qhs.  - C/w home meds

## 2025-02-13 NOTE — PROGRESS NOTE ADULT - PROBLEM SELECTOR PLAN 2
Pt febrile with 1-month history of foul smelling urine. Found to have + UA. S/p CTX 2g IV x1 in the ED.   - Follow up urine culture  - CTX 2g IV qd x7 days total (2/11- 2/17) Pt febrile with 1-month history of foul smelling urine. Found to have + UA. Urine cultures +E.coli. S/p CTX 2g IV x1 in the ED.   - Follow urine sensitivities for +E.coli on urine cx  - CTX 2g IV qd x7 days total (2/11- 2/17)  - Start Cefpodoxime 200mg BID until 2/17 upon d/c

## 2025-02-13 NOTE — PROGRESS NOTE ADULT - PROBLEM SELECTOR PLAN 1
Pt met SIRS criteria in the ED (Tmax 102F and , WBC 21.23). Source- UTI (UA+ small LE, nitrite, 48 WBCs) + PNA (subpleural 7 mm GGO in the [facility]) Lactate 1.3. CXR negative. RVP negative.   S/p 2L of NS bolus, Azithro 500mg IV x1, CTX 2g IV x1, Vanco 1g IV x1 in ED.   - Give 700cc of LR bolus for fluid resus  - Follow up blood cultures  - Follow up urine culture  - Treat UTI as below   - Tylenol PRN temp >100.4F Pt met SIRS criteria in the ED (Tmax 102F and , WBC 21.23). Source- UTI (UA+ small LE, nitrite, 48 WBCs) + PNA (subpleural 7 mm GGO in the [facility]) Lactate 1.3. CXR negative. RVP negative. Urine cultures +E.coli  S/p 2L of NS bolus, Azithro 500mg IV x1, CTX 2g IV x1, Vanco 1g IV x1 in ED.   - Give 700cc of LR bolus for fluid resus  - Follow up blood cultures  - Treat UTI as below   - Tylenol PRN temp >100.4F  - Follow urine sensitivities for +E.coli on urine cx

## 2025-02-13 NOTE — PROGRESS NOTE ADULT - ASSESSMENT
74-year-old male with hx of cervical spine injury in 2018 s/p spinal surgery, bedbound with chronic upper extremity flexion contractures, BPH, b/l glaucoma, hypotension (on midodrine); presenting with 2-day history of chills, body aches, productive cough with white sputum and 1-month history of foul smelling urine. Found to have + UA and in sepsis. Admitted for sepsis 2/2 UTI +/- viral URI vs CAP.   74-year-old male with hx of cervical spine injury in 2018 s/p spinal surgery, bedbound with chronic upper extremity flexion contractures, BPH, b/l glaucoma, hypotension (on midodrine); presenting with 2-day history of chills, body aches, productive cough with white sputum and 1-month history of foul smelling urine. Found to have + UA and in sepsis. Urine cx +E.coli. Admitted for sepsis 2/2 UTI +/- viral URI vs CAP.

## 2025-02-13 NOTE — PROGRESS NOTE ADULT - PROBLEM SELECTOR PLAN 7
Pt with Mg 1.6.   - Mg 2g IV x1  - Follow up AM Mg
Pt with Mg 1.6.   - Mg 2g IV x1  - Follow up AM Mg

## 2025-02-13 NOTE — PROGRESS NOTE ADULT - PROBLEM SELECTOR PLAN 3
RESOLVED  Patient presenting with 2-day history  chills, body, productive cough with white sputum, nasal congestion, headache and 1-day history of worsening dyspnea. CXR clear. RVP negative. CT PE negative for PE but + an ill-defined subpleural 7 mm GGO in the [facility]THA (series 4 image 89) with L basilar atelectasis. . Initially required 2L NC, now satting well on RA. There is a concern about CAP vs. viral PNA given symptoms, hypoxia and CT findings.  - Continue to monitor  - Obtain urine strep/legionella and sputum culture
RESOLVED  Patient presenting with 2-day history  chills, body, productive cough with white sputum, nasal congestion, headache and 1-day history of worsening dyspnea. CXR clear. RVP negative. CT PE negative for PE but + an ill-defined subpleural 7 mm GGO in the [facility] (series 4 image 89) with L basilar atelectasis. . Initially required 2L NC, now satting well on RA. There is a concern about CAP vs. viral PNA given symptoms, hypoxia and CT findings.  - Continue to monitor  - Obtain urine strep/legionella and sputum culture

## 2025-02-19 DIAGNOSIS — A41.51 SEPSIS DUE TO ESCHERICHIA COLI [E. COLI]: ICD-10-CM

## 2025-02-19 DIAGNOSIS — J18.9 PNEUMONIA, UNSPECIFIED ORGANISM: ICD-10-CM

## 2025-02-19 DIAGNOSIS — J96.01 ACUTE RESPIRATORY FAILURE WITH HYPOXIA: ICD-10-CM

## 2025-02-19 DIAGNOSIS — H40.9 UNSPECIFIED GLAUCOMA: ICD-10-CM

## 2025-02-19 DIAGNOSIS — E83.42 HYPOMAGNESEMIA: ICD-10-CM

## 2025-02-19 DIAGNOSIS — N39.0 URINARY TRACT INFECTION, SITE NOT SPECIFIED: ICD-10-CM

## 2025-02-19 DIAGNOSIS — D50.9 IRON DEFICIENCY ANEMIA, UNSPECIFIED: ICD-10-CM

## 2025-02-19 DIAGNOSIS — I50.9 HEART FAILURE, UNSPECIFIED: ICD-10-CM

## 2025-02-19 DIAGNOSIS — I95.9 HYPOTENSION, UNSPECIFIED: ICD-10-CM

## 2025-02-19 DIAGNOSIS — Z87.891 PERSONAL HISTORY OF NICOTINE DEPENDENCE: ICD-10-CM

## 2025-02-19 DIAGNOSIS — M62.429: ICD-10-CM

## 2025-02-19 DIAGNOSIS — I21.A1 MYOCARDIAL INFARCTION TYPE 2: ICD-10-CM

## 2025-02-19 DIAGNOSIS — N40.0 BENIGN PROSTATIC HYPERPLASIA WITHOUT LOWER URINARY TRACT SYMPTOMS: ICD-10-CM

## 2025-02-19 DIAGNOSIS — Z74.01 BED CONFINEMENT STATUS: ICD-10-CM

## 2025-02-20 ENCOUNTER — TRANSCRIPTION ENCOUNTER (OUTPATIENT)
Age: 75
End: 2025-02-20

## 2025-02-20 PROBLEM — Z86.79 PERSONAL HISTORY OF OTHER DISEASES OF THE CIRCULATORY SYSTEM: Chronic | Status: ACTIVE | Noted: 2025-02-11

## 2025-02-24 ENCOUNTER — TRANSCRIPTION ENCOUNTER (OUTPATIENT)
Age: 75
End: 2025-02-24

## 2025-02-25 ENCOUNTER — APPOINTMENT (OUTPATIENT)
Dept: CARE COORDINATION | Facility: HOME HEALTH | Age: 75
End: 2025-02-25
Payer: MEDICARE

## 2025-02-25 DIAGNOSIS — N40.0 BENIGN PROSTATIC HYPERPLASIA WITHOUT LOWER URINARY TRACT SYMPMS: ICD-10-CM

## 2025-02-25 DIAGNOSIS — J18.9 PNEUMONIA, UNSPECIFIED ORGANISM: ICD-10-CM

## 2025-02-25 DIAGNOSIS — H40.9 UNSPECIFIED GLAUCOMA: ICD-10-CM

## 2025-02-25 DIAGNOSIS — I95.9 HYPOTENSION, UNSPECIFIED: ICD-10-CM

## 2025-02-25 PROBLEM — Z00.00 ENCOUNTER FOR PREVENTIVE HEALTH EXAMINATION: Status: ACTIVE | Noted: 2025-02-25

## 2025-02-25 PROCEDURE — 99349 HOME/RES VST EST MOD MDM 40: CPT | Mod: 93

## 2025-02-25 RX ORDER — PREDNISOLONE ACETATE 10 MG/ML
1 SUSPENSION/ DROPS OPHTHALMIC
Refills: 0 | Status: ACTIVE | COMMUNITY

## 2025-02-25 RX ORDER — TIZANIDINE HYDROCHLORIDE 2 MG/1
2 CAPSULE ORAL
Refills: 0 | Status: ACTIVE | COMMUNITY

## 2025-02-25 RX ORDER — TAMSULOSIN HYDROCHLORIDE 0.4 MG/1
0.4 CAPSULE ORAL
Refills: 0 | Status: ACTIVE | COMMUNITY

## 2025-02-25 RX ORDER — LATANOPROSTENE BUNOD 0.24 MG/ML
0.02 SOLUTION/ DROPS OPHTHALMIC
Refills: 0 | Status: ACTIVE | COMMUNITY

## 2025-02-25 RX ORDER — FINASTERIDE 5 MG/1
5 TABLET, FILM COATED ORAL
Refills: 0 | Status: ACTIVE | COMMUNITY

## 2025-02-25 RX ORDER — FUROSEMIDE 10 MG/ML
10 SOLUTION ORAL
Refills: 0 | Status: ACTIVE | COMMUNITY

## 2025-02-25 RX ORDER — GABAPENTIN 300 MG/1
300 CAPSULE ORAL
Refills: 0 | Status: ACTIVE | COMMUNITY

## 2025-02-25 RX ORDER — DORZOLAMIDE HYDROCHLORIDE TIMOLOL MALEATE 20; 5 MG/ML; MG/ML
2-0.5 SOLUTION/ DROPS OPHTHALMIC
Refills: 0 | Status: ACTIVE | COMMUNITY

## 2025-02-25 RX ORDER — MIDODRINE HYDROCHLORIDE 10 MG/1
10 TABLET ORAL
Refills: 0 | Status: ACTIVE | COMMUNITY

## 2025-03-04 ENCOUNTER — TRANSCRIPTION ENCOUNTER (OUTPATIENT)
Age: 75
End: 2025-03-04

## 2025-03-11 ENCOUNTER — TRANSCRIPTION ENCOUNTER (OUTPATIENT)
Age: 75
End: 2025-03-11